# Patient Record
Sex: FEMALE | Race: WHITE | NOT HISPANIC OR LATINO | URBAN - METROPOLITAN AREA
[De-identification: names, ages, dates, MRNs, and addresses within clinical notes are randomized per-mention and may not be internally consistent; named-entity substitution may affect disease eponyms.]

---

## 2018-05-30 ENCOUNTER — EMERGENCY (EMERGENCY)
Facility: HOSPITAL | Age: 43
LOS: 1 days | Discharge: HOME | End: 2018-05-30
Attending: EMERGENCY MEDICINE | Admitting: EMERGENCY MEDICINE
Payer: MEDICAID

## 2018-05-30 VITALS
OXYGEN SATURATION: 97 % | HEART RATE: 70 BPM | RESPIRATION RATE: 16 BRPM | HEIGHT: 66 IN | TEMPERATURE: 98 F | DIASTOLIC BLOOD PRESSURE: 79 MMHG | SYSTOLIC BLOOD PRESSURE: 136 MMHG | WEIGHT: 119.93 LBS

## 2018-05-30 VITALS
RESPIRATION RATE: 16 BRPM | SYSTOLIC BLOOD PRESSURE: 124 MMHG | DIASTOLIC BLOOD PRESSURE: 62 MMHG | OXYGEN SATURATION: 100 % | HEART RATE: 75 BPM | TEMPERATURE: 99 F

## 2018-05-30 LAB
ALBUMIN SERPL ELPH-MCNC: 3.7 G/DL — SIGNIFICANT CHANGE UP (ref 3.3–5)
ALP SERPL-CCNC: 51 U/L — SIGNIFICANT CHANGE UP (ref 30–120)
ALT FLD-CCNC: 22 U/L DA — SIGNIFICANT CHANGE UP (ref 10–60)
ANION GAP SERPL CALC-SCNC: 4 MMOL/L — LOW (ref 5–17)
APPEARANCE UR: CLEAR — SIGNIFICANT CHANGE UP
AST SERPL-CCNC: 22 U/L — SIGNIFICANT CHANGE UP (ref 10–40)
BASOPHILS # BLD AUTO: 0.1 K/UL — SIGNIFICANT CHANGE UP (ref 0–0.2)
BASOPHILS NFR BLD AUTO: 0.8 % — SIGNIFICANT CHANGE UP (ref 0–2)
BILIRUB SERPL-MCNC: 0.3 MG/DL — SIGNIFICANT CHANGE UP (ref 0.2–1.2)
BILIRUB UR-MCNC: NEGATIVE — SIGNIFICANT CHANGE UP
BUN SERPL-MCNC: 22 MG/DL — SIGNIFICANT CHANGE UP (ref 7–23)
CALCIUM SERPL-MCNC: 9 MG/DL — SIGNIFICANT CHANGE UP (ref 8.4–10.5)
CHLORIDE SERPL-SCNC: 101 MMOL/L — SIGNIFICANT CHANGE UP (ref 96–108)
CK MB CFR SERPL CALC: 0.8 NG/ML — SIGNIFICANT CHANGE UP (ref 0–3.6)
CK SERPL-CCNC: 112 U/L — SIGNIFICANT CHANGE UP (ref 26–192)
CO2 SERPL-SCNC: 32 MMOL/L — HIGH (ref 22–31)
COLOR SPEC: YELLOW — SIGNIFICANT CHANGE UP
CREAT SERPL-MCNC: 1.08 MG/DL — SIGNIFICANT CHANGE UP (ref 0.5–1.3)
DIFF PNL FLD: ABNORMAL
EOSINOPHIL # BLD AUTO: 0.1 K/UL — SIGNIFICANT CHANGE UP (ref 0–0.5)
EOSINOPHIL NFR BLD AUTO: 1 % — SIGNIFICANT CHANGE UP (ref 0–6)
GLUCOSE SERPL-MCNC: 108 MG/DL — HIGH (ref 70–99)
GLUCOSE UR QL: NEGATIVE MG/DL — SIGNIFICANT CHANGE UP
HCT VFR BLD CALC: 41.8 % — SIGNIFICANT CHANGE UP (ref 34.5–45)
HGB BLD-MCNC: 13.9 G/DL — SIGNIFICANT CHANGE UP (ref 11.5–15.5)
KETONES UR-MCNC: NEGATIVE — SIGNIFICANT CHANGE UP
LEUKOCYTE ESTERASE UR-ACNC: ABNORMAL
LYMPHOCYTES # BLD AUTO: 1.6 K/UL — SIGNIFICANT CHANGE UP (ref 1–3.3)
LYMPHOCYTES # BLD AUTO: 22.9 % — SIGNIFICANT CHANGE UP (ref 13–44)
MCHC RBC-ENTMCNC: 32.9 PG — SIGNIFICANT CHANGE UP (ref 27–34)
MCHC RBC-ENTMCNC: 33.2 GM/DL — SIGNIFICANT CHANGE UP (ref 32–36)
MCV RBC AUTO: 99.1 FL — SIGNIFICANT CHANGE UP (ref 80–100)
MONOCYTES # BLD AUTO: 0.5 K/UL — SIGNIFICANT CHANGE UP (ref 0–0.9)
MONOCYTES NFR BLD AUTO: 6.5 % — SIGNIFICANT CHANGE UP (ref 2–14)
NEUTROPHILS # BLD AUTO: 4.9 K/UL — SIGNIFICANT CHANGE UP (ref 1.8–7.4)
NEUTROPHILS NFR BLD AUTO: 68.8 % — SIGNIFICANT CHANGE UP (ref 43–77)
NITRITE UR-MCNC: NEGATIVE — SIGNIFICANT CHANGE UP
PH UR: 6 — SIGNIFICANT CHANGE UP (ref 5–8)
PLATELET # BLD AUTO: 222 K/UL — SIGNIFICANT CHANGE UP (ref 150–400)
POTASSIUM SERPL-MCNC: 4.2 MMOL/L — SIGNIFICANT CHANGE UP (ref 3.5–5.3)
POTASSIUM SERPL-SCNC: 4.2 MMOL/L — SIGNIFICANT CHANGE UP (ref 3.5–5.3)
PROT SERPL-MCNC: 7.5 G/DL — SIGNIFICANT CHANGE UP (ref 6–8.3)
PROT UR-MCNC: NEGATIVE MG/DL — SIGNIFICANT CHANGE UP
RBC # BLD: 4.22 M/UL — SIGNIFICANT CHANGE UP (ref 3.8–5.2)
RBC # FLD: 11.2 % — SIGNIFICANT CHANGE UP (ref 10.3–14.5)
SODIUM SERPL-SCNC: 137 MMOL/L — SIGNIFICANT CHANGE UP (ref 135–145)
SP GR SPEC: 1.01 — SIGNIFICANT CHANGE UP (ref 1.01–1.02)
TROPONIN I SERPL-MCNC: 0 NG/ML — LOW (ref 0.02–0.06)
UROBILINOGEN FLD QL: NEGATIVE MG/DL — SIGNIFICANT CHANGE UP
WBC # BLD: 7.1 K/UL — SIGNIFICANT CHANGE UP (ref 3.8–10.5)
WBC # FLD AUTO: 7.1 K/UL — SIGNIFICANT CHANGE UP (ref 3.8–10.5)

## 2018-05-30 PROCEDURE — 99284 EMERGENCY DEPT VISIT MOD MDM: CPT | Mod: 25

## 2018-05-30 PROCEDURE — 70450 CT HEAD/BRAIN W/O DYE: CPT

## 2018-05-30 PROCEDURE — 96374 THER/PROPH/DIAG INJ IV PUSH: CPT

## 2018-05-30 PROCEDURE — 99285 EMERGENCY DEPT VISIT HI MDM: CPT

## 2018-05-30 PROCEDURE — 82550 ASSAY OF CK (CPK): CPT

## 2018-05-30 PROCEDURE — 80053 COMPREHEN METABOLIC PANEL: CPT

## 2018-05-30 PROCEDURE — 93010 ELECTROCARDIOGRAM REPORT: CPT

## 2018-05-30 PROCEDURE — 82553 CREATINE MB FRACTION: CPT

## 2018-05-30 PROCEDURE — 93005 ELECTROCARDIOGRAM TRACING: CPT

## 2018-05-30 PROCEDURE — 81001 URINALYSIS AUTO W/SCOPE: CPT

## 2018-05-30 PROCEDURE — 96361 HYDRATE IV INFUSION ADD-ON: CPT

## 2018-05-30 PROCEDURE — 84484 ASSAY OF TROPONIN QUANT: CPT

## 2018-05-30 PROCEDURE — 70450 CT HEAD/BRAIN W/O DYE: CPT | Mod: 26

## 2018-05-30 PROCEDURE — 85027 COMPLETE CBC AUTOMATED: CPT

## 2018-05-30 RX ORDER — MECLIZINE HCL 12.5 MG
25 TABLET ORAL ONCE
Qty: 0 | Refills: 0 | Status: COMPLETED | OUTPATIENT
Start: 2018-05-30 | End: 2018-05-30

## 2018-05-30 RX ORDER — SODIUM CHLORIDE 9 MG/ML
1000 INJECTION INTRAMUSCULAR; INTRAVENOUS; SUBCUTANEOUS ONCE
Qty: 0 | Refills: 0 | Status: COMPLETED | OUTPATIENT
Start: 2018-05-30 | End: 2018-05-30

## 2018-05-30 RX ORDER — ACETAMINOPHEN 500 MG
650 TABLET ORAL ONCE
Qty: 0 | Refills: 0 | Status: COMPLETED | OUTPATIENT
Start: 2018-05-30 | End: 2018-05-30

## 2018-05-30 RX ORDER — ONDANSETRON 8 MG/1
4 TABLET, FILM COATED ORAL ONCE
Qty: 0 | Refills: 0 | Status: COMPLETED | OUTPATIENT
Start: 2018-05-30 | End: 2018-05-30

## 2018-05-30 RX ADMIN — Medication 25 MILLIGRAM(S): at 16:13

## 2018-05-30 RX ADMIN — ONDANSETRON 4 MILLIGRAM(S): 8 TABLET, FILM COATED ORAL at 15:29

## 2018-05-30 RX ADMIN — SODIUM CHLORIDE 1000 MILLILITER(S): 9 INJECTION INTRAMUSCULAR; INTRAVENOUS; SUBCUTANEOUS at 15:15

## 2018-05-30 RX ADMIN — Medication 650 MILLIGRAM(S): at 16:13

## 2018-05-30 NOTE — ED PROVIDER NOTE - CONDUCTED A DETAILED DISCUSSION WITH PATIENT AND/OR GUARDIAN REGARDING, MDM
radiology results/lab results need for outpatient follow-up/lab results/return to ED if symptoms worsen, persist or questions arise/radiology results

## 2018-05-30 NOTE — ED PROVIDER NOTE - OBJECTIVE STATEMENT
41 y/o F presents with c/o dizziness today. Pt states that she felt slightly dizzy this morning but fine after. States that she was walking with her friend this afternoon when she started feeling dizzy again, sat down and became "unresponsive for ~5-10 mins" as per pt. Pt states that she feels better after pt 43 y/o F presents with c/o dizziness today. Pt states that she felt slightly dizzy this morning but fine after. States that she was walking with her friend this afternoon when she started feeling dizzy again, sat down and became "unresponsive for ~5-10 mins" as per pt. Pt states that she feels better now but still dizzy and nauseous. States that she has been on augmentin since last Thursday for a sinus infection and has been feeling better. C/o mild pressure to forehead. Denies fever, chills, vision changes, vomiting, CP, SOB, numbness, tingling, focal weakness, leg pain/swelling or other symptoms. 43 y/o F presents with c/o dizziness today. Pt states that she felt slightly dizzy this morning but fine after. States that she was walking with her friend this afternoon when she started feeling dizzy again, sat down and became "unresponsive for ~5-10 mins" as per pt. Pt states that she feels better now but still dizzy and nauseous. States that she has been on augmentin since last Thursday for a sinus infection. C/o mild pressure to forehead. Denies fever, chills, vision changes, vomiting, CP, SOB, numbness, tingling, focal weakness, leg pain/swelling or other symptoms.

## 2018-05-30 NOTE — ED PROVIDER NOTE - PROGRESS NOTE DETAILS
Pt examined by ED attending, Dr. Lombardi who agreed with disposition and plan. Attending Contribution to Care:  42 y.o F c/o dizziness and possibly having passed out today. Pt has been on augmentin for 7 days for sinus infection, c/o pressure in sinuses. denies fever, headache, visual disturbance. PE unrevealing, neuro intact. Plan- CT head, labs, EKG. If negative refer to ENT for evaluation of sinusitis. Reevaluated patient at bedside.  Patient feeling much improved.  Discussed the results of all diagnostic testing in ED and copies of all reports given.   An opportunity to ask questions was given.  Discussed the importance of prompt, close medical follow-up.  Patient will return with any changes, concerns or persistent / worsening symptoms.  Understanding of all instructions verbalized.

## 2018-05-30 NOTE — ED PROVIDER NOTE - MEDICAL DECISION MAKING DETAILS
43 y/o F with dizziness and syncope today; +nausea and pressure to forehead with dizziness now; VSS, no neuro deficits; will get ct head, ekg, labs, ivf, zofran, re-assess

## 2019-01-11 ENCOUNTER — EMERGENCY (EMERGENCY)
Facility: HOSPITAL | Age: 44
LOS: 1 days | End: 2019-01-11
Attending: EMERGENCY MEDICINE | Admitting: EMERGENCY MEDICINE
Payer: MEDICAID

## 2019-01-11 VITALS
SYSTOLIC BLOOD PRESSURE: 109 MMHG | OXYGEN SATURATION: 96 % | DIASTOLIC BLOOD PRESSURE: 57 MMHG | RESPIRATION RATE: 14 BRPM | HEART RATE: 72 BPM | TEMPERATURE: 99 F

## 2019-01-11 VITALS
OXYGEN SATURATION: 97 % | HEIGHT: 66 IN | DIASTOLIC BLOOD PRESSURE: 81 MMHG | TEMPERATURE: 98 F | WEIGHT: 125 LBS | SYSTOLIC BLOOD PRESSURE: 127 MMHG | HEART RATE: 84 BPM | RESPIRATION RATE: 16 BRPM

## 2019-01-11 LAB
ALBUMIN SERPL ELPH-MCNC: 3.6 G/DL — SIGNIFICANT CHANGE UP (ref 3.3–5)
ALP SERPL-CCNC: 45 U/L — SIGNIFICANT CHANGE UP (ref 30–120)
ALT FLD-CCNC: 80 U/L DA — HIGH (ref 10–60)
ANION GAP SERPL CALC-SCNC: 5 MMOL/L — SIGNIFICANT CHANGE UP (ref 5–17)
APPEARANCE UR: CLEAR — SIGNIFICANT CHANGE UP
AST SERPL-CCNC: 237 U/L — HIGH (ref 10–40)
BACTERIA # UR AUTO: ABNORMAL
BASOPHILS # BLD AUTO: 0.03 K/UL — SIGNIFICANT CHANGE UP (ref 0–0.2)
BASOPHILS NFR BLD AUTO: 0.5 % — SIGNIFICANT CHANGE UP (ref 0–2)
BILIRUB SERPL-MCNC: 0.5 MG/DL — SIGNIFICANT CHANGE UP (ref 0.2–1.2)
BILIRUB UR-MCNC: NEGATIVE — SIGNIFICANT CHANGE UP
BUN SERPL-MCNC: 13 MG/DL — SIGNIFICANT CHANGE UP (ref 7–23)
CALCIUM SERPL-MCNC: 8.8 MG/DL — SIGNIFICANT CHANGE UP (ref 8.4–10.5)
CHLORIDE SERPL-SCNC: 102 MMOL/L — SIGNIFICANT CHANGE UP (ref 96–108)
CO2 SERPL-SCNC: 30 MMOL/L — SIGNIFICANT CHANGE UP (ref 22–31)
COLOR SPEC: YELLOW — SIGNIFICANT CHANGE UP
CREAT SERPL-MCNC: 0.92 MG/DL — SIGNIFICANT CHANGE UP (ref 0.5–1.3)
DIFF PNL FLD: ABNORMAL
EOSINOPHIL # BLD AUTO: 0.3 K/UL — SIGNIFICANT CHANGE UP (ref 0–0.5)
EOSINOPHIL NFR BLD AUTO: 5 % — SIGNIFICANT CHANGE UP (ref 0–6)
EPI CELLS # UR: SIGNIFICANT CHANGE UP
GLUCOSE SERPL-MCNC: 91 MG/DL — SIGNIFICANT CHANGE UP (ref 70–99)
GLUCOSE UR QL: NEGATIVE MG/DL — SIGNIFICANT CHANGE UP
HCG UR QL: NEGATIVE — SIGNIFICANT CHANGE UP
HCT VFR BLD CALC: 40.2 % — SIGNIFICANT CHANGE UP (ref 34.5–45)
HGB BLD-MCNC: 13.5 G/DL — SIGNIFICANT CHANGE UP (ref 11.5–15.5)
IMM GRANULOCYTES NFR BLD AUTO: 0.3 % — SIGNIFICANT CHANGE UP (ref 0–1.5)
KETONES UR-MCNC: NEGATIVE — SIGNIFICANT CHANGE UP
LEUKOCYTE ESTERASE UR-ACNC: ABNORMAL
LYMPHOCYTES # BLD AUTO: 1.45 K/UL — SIGNIFICANT CHANGE UP (ref 1–3.3)
LYMPHOCYTES # BLD AUTO: 24.1 % — SIGNIFICANT CHANGE UP (ref 13–44)
MAGNESIUM SERPL-MCNC: 1.8 MG/DL — SIGNIFICANT CHANGE UP (ref 1.6–2.6)
MCHC RBC-ENTMCNC: 32.7 PG — SIGNIFICANT CHANGE UP (ref 27–34)
MCHC RBC-ENTMCNC: 33.6 GM/DL — SIGNIFICANT CHANGE UP (ref 32–36)
MCV RBC AUTO: 97.3 FL — SIGNIFICANT CHANGE UP (ref 80–100)
MONOCYTES # BLD AUTO: 0.45 K/UL — SIGNIFICANT CHANGE UP (ref 0–0.9)
MONOCYTES NFR BLD AUTO: 7.5 % — SIGNIFICANT CHANGE UP (ref 2–14)
NEUTROPHILS # BLD AUTO: 3.76 K/UL — SIGNIFICANT CHANGE UP (ref 1.8–7.4)
NEUTROPHILS NFR BLD AUTO: 62.6 % — SIGNIFICANT CHANGE UP (ref 43–77)
NITRITE UR-MCNC: NEGATIVE — SIGNIFICANT CHANGE UP
NRBC # BLD: 0 /100 WBCS — SIGNIFICANT CHANGE UP (ref 0–0)
PH UR: 5 — SIGNIFICANT CHANGE UP (ref 5–8)
PHOSPHATE SERPL-MCNC: 2.7 MG/DL — SIGNIFICANT CHANGE UP (ref 2.5–4.5)
PLATELET # BLD AUTO: 192 K/UL — SIGNIFICANT CHANGE UP (ref 150–400)
POTASSIUM SERPL-MCNC: 4 MMOL/L — SIGNIFICANT CHANGE UP (ref 3.5–5.3)
POTASSIUM SERPL-SCNC: 4 MMOL/L — SIGNIFICANT CHANGE UP (ref 3.5–5.3)
PROT SERPL-MCNC: 6.5 G/DL — SIGNIFICANT CHANGE UP (ref 6–8.3)
PROT UR-MCNC: 30 MG/DL
RBC # BLD: 4.13 M/UL — SIGNIFICANT CHANGE UP (ref 3.8–5.2)
RBC # FLD: 11.9 % — SIGNIFICANT CHANGE UP (ref 10.3–14.5)
RBC CASTS # UR COMP ASSIST: SIGNIFICANT CHANGE UP /HPF (ref 0–4)
SODIUM SERPL-SCNC: 137 MMOL/L — SIGNIFICANT CHANGE UP (ref 135–145)
SP GR SPEC: 1.02 — SIGNIFICANT CHANGE UP (ref 1.01–1.02)
UROBILINOGEN FLD QL: NEGATIVE MG/DL — SIGNIFICANT CHANGE UP
WBC # BLD: 6.01 K/UL — SIGNIFICANT CHANGE UP (ref 3.8–10.5)
WBC # FLD AUTO: 6.01 K/UL — SIGNIFICANT CHANGE UP (ref 3.8–10.5)
WBC UR QL: SIGNIFICANT CHANGE UP

## 2019-01-11 PROCEDURE — 70450 CT HEAD/BRAIN W/O DYE: CPT

## 2019-01-11 PROCEDURE — 99284 EMERGENCY DEPT VISIT MOD MDM: CPT | Mod: 25

## 2019-01-11 PROCEDURE — 81025 URINE PREGNANCY TEST: CPT

## 2019-01-11 PROCEDURE — 83735 ASSAY OF MAGNESIUM: CPT

## 2019-01-11 PROCEDURE — 99285 EMERGENCY DEPT VISIT HI MDM: CPT

## 2019-01-11 PROCEDURE — 36415 COLL VENOUS BLD VENIPUNCTURE: CPT

## 2019-01-11 PROCEDURE — 85027 COMPLETE CBC AUTOMATED: CPT

## 2019-01-11 PROCEDURE — 80053 COMPREHEN METABOLIC PANEL: CPT

## 2019-01-11 PROCEDURE — 84100 ASSAY OF PHOSPHORUS: CPT

## 2019-01-11 PROCEDURE — 70450 CT HEAD/BRAIN W/O DYE: CPT | Mod: 26

## 2019-01-11 PROCEDURE — 81001 URINALYSIS AUTO W/SCOPE: CPT

## 2019-01-11 PROCEDURE — 82962 GLUCOSE BLOOD TEST: CPT

## 2019-01-11 RX ORDER — LEVETIRACETAM 250 MG/1
1000 TABLET, FILM COATED ORAL ONCE
Qty: 0 | Refills: 0 | Status: COMPLETED | OUTPATIENT
Start: 2019-01-11 | End: 2019-01-11

## 2019-01-11 RX ORDER — ACETAMINOPHEN 500 MG
650 TABLET ORAL ONCE
Qty: 0 | Refills: 0 | Status: COMPLETED | OUTPATIENT
Start: 2019-01-11 | End: 2019-01-11

## 2019-01-11 RX ORDER — LEVETIRACETAM 250 MG/1
1 TABLET, FILM COATED ORAL
Qty: 60 | Refills: 6 | OUTPATIENT
Start: 2019-01-11 | End: 2019-08-08

## 2019-01-11 RX ADMIN — LEVETIRACETAM 1000 MILLIGRAM(S): 250 TABLET, FILM COATED ORAL at 09:58

## 2019-01-11 RX ADMIN — Medication 650 MILLIGRAM(S): at 10:06

## 2019-01-11 RX ADMIN — Medication 650 MILLIGRAM(S): at 09:23

## 2019-01-11 NOTE — ED PROVIDER NOTE - NSFOLLOWUPCLINICS_GEN_ALL_ED_FT
[facility]  Neurology  [address] [place] [address] - [address] [place] [address]  Phone: [phone]  Fax:   Follow Up Time: 4-6 Days

## 2019-01-11 NOTE — ED PROVIDER NOTE - NSFOLLOWUPINSTRUCTIONS_ED_ALL_ED_FT
1. Follow up Neurologist for new onset seizure for further workup.  2. Keppra 500mg twice a day.  Avoid alcohol.  Compliance is key with this medication.  3. No driving, swimming, ladders until cleared by Neurologist.  4. Tylenol and Motrin for pain/headache.  5. Return for any concerns.  6. Follow up PCP for routine follow up and copy of labs and ct to your PCP.

## 2019-01-11 NOTE — ED PROVIDER NOTE - MEDICAL DECISION MAKING DETAILS
Dr. Shea Note: new onset seizure, consider possible pathological causes given age, given 2nd possible episode, would consider starting treatment and refer to neuro

## 2019-01-11 NOTE — ED ADULT NURSE NOTE - NSIMPLEMENTINTERV_GEN_ALL_ED
Implemented All Universal Safety Interventions:  [place] to call system. Call bell, personal items and telephone within reach. Instruct patient to call for assistance. Room bathroom lighting operational. Non-slip footwear when patient is off stretcher. Physically safe environment: no spills, clutter or unnecessary equipment. Stretcher in lowest position, wheels locked, appropriate side rails in place.

## 2019-01-11 NOTE — ED ADULT NURSE NOTE - OBJECTIVE STATEMENT
Patient brought to ER via ambulance from home where patient had a seizure in bed witnessed by .   states "she became stiff, eyes rolled back, foaming at mouth lasting for over a minute."  On arrival to ER patient awake, alert to name, does not know day of week.  No incontinence noted.  IV med lock #20 left AC by EMS.  Accucheck in ER 90.   at bedside.  CM:NSR HR 70's.

## 2019-01-11 NOTE — ED PROVIDER NOTE - OBJECTIVE STATEMENT
Dr. Shea Note: 43F presents via EMS with  at bedside who witnessed patient having a seizure upon awakening in bed, lasted <5min, had post-ictal agitation for several minutes, then confusion, and now back to baseline.  No fever, vomiting, cp, sob.  +slight post-ictal headache.  No preceding symptoms (pt asleep).  Had an episode May 2018 (see ED chart for details).

## 2019-01-11 NOTE — ED PROVIDER NOTE - PROGRESS NOTE DETAILS
Dr. Shea Note: pt stable, spoke to Dr. Melchor who agrees with starting keppra and f/u outpt, pt and family educated about seizure, no driving, no ladders, pools, avoid alcohol, medication complaince, side effects of keppra, stable for dc home, f/u neuro for MRI and EEG.  Also educated about LFTs and f/u PCP.

## 2019-01-12 PROBLEM — R56.9 UNSPECIFIED CONVULSIONS: Chronic | Status: INACTIVE | Noted: 2019-01-11 | Resolved: 2019-01-11

## 2019-01-23 PROBLEM — I82.90 ACUTE EMBOLISM AND THROMBOSIS OF UNSPECIFIED VEIN: Chronic | Status: ACTIVE | Noted: 2019-01-11

## 2019-02-28 ENCOUNTER — APPOINTMENT (OUTPATIENT)
Dept: NEUROLOGY | Facility: CLINIC | Age: 44
End: 2019-02-28
Payer: MEDICAID

## 2019-02-28 VITALS
DIASTOLIC BLOOD PRESSURE: 71 MMHG | HEART RATE: 58 BPM | HEIGHT: 66.5 IN | SYSTOLIC BLOOD PRESSURE: 115 MMHG | BODY MASS INDEX: 19.06 KG/M2 | WEIGHT: 120 LBS

## 2019-02-28 PROCEDURE — 99204 OFFICE O/P NEW MOD 45 MIN: CPT

## 2019-02-28 NOTE — HISTORY OF PRESENT ILLNESS
[FreeTextEntry1] : cc- seizures\par \par HpI- 42 y/o Rh woman was well until 5/30/18 when she had a sudden feeling as if she would pass out.  Had a sinus infection on Amoxacillin.  Was walking with friend and felt as if she would pass out. Sat on the ground and at first aware her friend was calling her but then could not focus and was unaware but laughing for about 2-3 minutes. She remained in a seated posture.and became aware but still giggling when  arrived.   brought her to [facility]- ct c/w sinusitis and labs unremarkable. \par \par Saw PMD who ordered a three day heart monitor that was normal. Was well until Sept 5th 2018 awoke with left eye blurred vision and a diagonal line of vision through her left eye vision. Saw ophthalmologist who found central retinal vein occlusion.  Had a hematological w/u that showed a positive anticardiolipin Ab (IgM 17) and anti-glycoprotein Ab (20).\par \par Take off OCPs and placed on baby ASA. Left eye vision was 20/50-2.\par Vision had gradually improved and ASA tapered off.  Titers improved.\par \par On Jan 11th at 630 AM,  witnessed a 2 mins of generalized seizure in bed. Had postictal myoclonus and post ictal state for 30-45 mins.  Had interrupted sleep but not that unusual.  EMS arrived in about ten minutes.  She was confused and then combative.  No TB or incontinence. Became oriented in the ambulance.  At [place] [facility], CT Head negative.  Started on Levetiracetam 500 bid.\par Saw Dr. Mari who doubled it but she became irritable and tired.  Dose was reduced to 500-1000.  No longer has any side effects. \par \par Had routine and a home VEEG. VEEG showed rare generalized spike and wave with bifrontal  predominance.  MRI brain, TCDs and carotids were normal.\par \par \par

## 2019-02-28 NOTE — PHYSICAL EXAM
[FreeTextEntry1] : alert and oriented x3\par attn conc lang nl\par STM 2/3 at 5 mins\par CN intact in detail\par DTRs 3 and symmetric\par motor- 5/5\par sens- intact in detail\par CSG wnl

## 2019-02-28 NOTE — ASSESSMENT
[FreeTextEntry1] : A/P- 44 y/o RH woman well without epilepsy risk factors until May 2018 when she suffered an unusual episode of lightheadedness followed by loss of awareness with inappropriate laughter.  In September had a left central retinal vein occlusion and subsequent diagnosis of an antiphospholipid antibody syndrome for which she was taken off OCPs and placed on ASA. After doing well for a few months she had a witnessed GTC in sleep. MRI unremarkable but EEG with ?generalized bifrontally predominant spike and wave discharges in sleep. \par \par It is difficult to connect the hematological abnormality to the new onset seizures with a normal MRI and cerebro-vascular work up. It is also unclear whether the initial event in May was a seizure or not. 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 She has not had further seizures on Keppra and is tolerating the drug, except for only mild patricai-menstrual irritability, although she would like to take less medication if possible.\par - continue Levetiracetam 500-1000 for now\par - I will review the EEG tracing if possible as well as the MRI images when sent to me\par - we will consider a reduction in levetiracetam dosage in 4-6 weeks if no episodes followed by a 48 hr ambulatory EEG\par - she will continue to take a baby ASA\par - I have instructed her about [place] state driving laws and not driving for a minimum of 6 mos\par - rtc 4-6 weeks\par

## 2019-03-01 ENCOUNTER — RECORD ABSTRACTING (OUTPATIENT)
Age: 44
End: 2019-03-01

## 2019-03-01 DIAGNOSIS — Z83.438 FAMILY HISTORY OF OTHER DISORDER OF LIPOPROTEIN METABOLISM AND OTHER LIPIDEMIA: ICD-10-CM

## 2019-03-01 DIAGNOSIS — M51.26 OTHER INTERVERTEBRAL DISC DISPLACEMENT, LUMBAR REGION: ICD-10-CM

## 2019-03-01 DIAGNOSIS — M51.36 OTHER INTERVERTEBRAL DISC DISPLACEMENT, LUMBAR REGION: ICD-10-CM

## 2019-04-02 ENCOUNTER — APPOINTMENT (OUTPATIENT)
Dept: NEUROLOGY | Facility: HOSPITAL | Age: 44
End: 2019-04-02

## 2019-04-03 ENCOUNTER — APPOINTMENT (OUTPATIENT)
Dept: NEUROLOGY | Facility: CLINIC | Age: 44
End: 2019-04-03
Payer: MEDICAID

## 2019-04-03 VITALS
HEART RATE: 64 BPM | HEIGHT: 66.5 IN | BODY MASS INDEX: 19.22 KG/M2 | DIASTOLIC BLOOD PRESSURE: 74 MMHG | WEIGHT: 121 LBS | SYSTOLIC BLOOD PRESSURE: 126 MMHG

## 2019-04-03 PROCEDURE — 99214 OFFICE O/P EST MOD 30 MIN: CPT

## 2019-04-03 RX ORDER — AZELASTINE HYDROCHLORIDE AND FLUTICASONE PROPIONATE 137; 50 UG/1; UG/1
137-50 SPRAY, METERED NASAL
Refills: 0 | Status: DISCONTINUED | COMMUNITY
End: 2019-04-03

## 2019-04-03 RX ORDER — MELOXICAM 7.5 MG/1
7.5 TABLET ORAL DAILY
Refills: 0 | Status: DISCONTINUED | COMMUNITY
End: 2019-04-03

## 2019-04-03 NOTE — PHYSICAL EXAM
[FreeTextEntry1] : Neuro\par alert and oriented x3\par attn conc lang nl \par Cn intact in detail\par motor 5/5\par sensory intact in detail\par CSG - wnl

## 2019-04-03 NOTE — ASSESSMENT
[FreeTextEntry1] : \par A/P- 42 y/o RH woman well without epilepsy risk factors until May 2018 when she suffered an unusual episode of lightheadedness followed by loss of awareness with inappropriate laughter. In September had a left central retinal vein occlusion and subsequent diagnosis of an antiphospholipid antibody syndrome for which she was taken off OCPs and placed on ASA. After doing well for a few months she had a witnessed GTC in sleep. MRI unremarkable but EEG with ?generalized bifrontally predominant spike and wave discharges in sleep. \par \par It is difficult to connect the hematological abnormality to the new onset seizures with a normal MRI and cerebro-vascular work up. It is also unclear whether the initial event in May was a seizure or not. 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 She has not had further seizures on Keppra and is tolerating the drug, except for only mild patricia-menstrual irritability, although she would like to take less medication if possible.\par - reduce Keppra to 500-750 x1 mos, then two weeks later 500 bid and 48hr amb EEG.\par RTC 3-4 mos after amb EEG\par - also c/o sleep difficulty and disordered breathing\par - RTC 3-4m\par

## 2019-04-03 NOTE — HISTORY OF PRESENT ILLNESS
[FreeTextEntry1] : cc- seizures\par \par HPI- Since last visit no auras, seizures, syncope or focal sxs.\par Only c/o is new joint aches ?when Keppra started.\par Sleep has changed, ?breathing different.\par \par alprazolam 0.25 (never uses)\par Levetiracetam  500-1000\par L- methylfolate\par baby ASA

## 2019-04-08 ENCOUNTER — APPOINTMENT (OUTPATIENT)
Dept: RHEUMATOLOGY | Facility: CLINIC | Age: 44
End: 2019-04-08

## 2019-05-17 ENCOUNTER — APPOINTMENT (OUTPATIENT)
Dept: NEUROLOGY | Facility: CLINIC | Age: 44
End: 2019-05-17

## 2019-07-17 ENCOUNTER — OTHER (OUTPATIENT)
Age: 44
End: 2019-07-17

## 2019-07-29 ENCOUNTER — APPOINTMENT (OUTPATIENT)
Dept: NEUROLOGY | Facility: CLINIC | Age: 44
End: 2019-07-29
Payer: MEDICAID

## 2019-07-29 PROCEDURE — 95806 SLEEP STUDY UNATT&RESP EFFT: CPT

## 2019-07-29 PROCEDURE — 95819 EEG AWAKE AND ASLEEP: CPT

## 2019-07-30 ENCOUNTER — OTHER (OUTPATIENT)
Age: 44
End: 2019-07-30

## 2019-07-30 PROCEDURE — 95953: CPT

## 2019-07-31 PROCEDURE — 95953: CPT

## 2019-08-05 ENCOUNTER — OTHER (OUTPATIENT)
Age: 44
End: 2019-08-05

## 2019-11-29 ENCOUNTER — MOBILE ON CALL (OUTPATIENT)
Age: 44
End: 2019-11-29

## 2019-12-04 ENCOUNTER — APPOINTMENT (OUTPATIENT)
Dept: NEUROLOGY | Facility: CLINIC | Age: 44
End: 2019-12-04
Payer: MEDICAID

## 2019-12-04 VITALS
HEART RATE: 62 BPM | BODY MASS INDEX: 19.22 KG/M2 | SYSTOLIC BLOOD PRESSURE: 112 MMHG | WEIGHT: 121 LBS | HEIGHT: 66.5 IN | DIASTOLIC BLOOD PRESSURE: 73 MMHG

## 2019-12-04 PROCEDURE — 99214 OFFICE O/P EST MOD 30 MIN: CPT

## 2019-12-06 NOTE — ASSESSMENT
[FreeTextEntry1] : 42 y/o RH woman well without epilepsy risk factors until May 2018 when she suffered an unusual episode of lightheadedness followed by loss of awareness with inappropriate laughter. In September had a left central retinal vein occlusion and subsequent diagnosis of an antiphospholipid antibody syndrome for which she was taken off OCPs and placed on ASA. After doing well for a few months she had a witnessed GTC in sleep. MRI unremarkable but EEG with ?generalized bifrontally predominant spike and wave discharges in sleep. \par Tolerating keppra, amb EEG negative, no PEPE on sleep study,\par -cont Keppra 500 bid\par - RTC 3-4m\par

## 2019-12-06 NOTE — PHYSICAL EXAM
[FreeTextEntry1] : Neuro\par alert and oriented x3\par attn conc lang nl \par Cn intact in detail\par motor 5/5\par sensory intact in detail\par CSG - wnl. \par

## 2020-01-09 ENCOUNTER — APPOINTMENT (OUTPATIENT)
Dept: INTERNAL MEDICINE | Facility: CLINIC | Age: 45
End: 2020-01-09
Payer: MEDICAID

## 2020-01-09 VITALS
BODY MASS INDEX: 19.86 KG/M2 | RESPIRATION RATE: 16 BRPM | DIASTOLIC BLOOD PRESSURE: 68 MMHG | SYSTOLIC BLOOD PRESSURE: 116 MMHG | WEIGHT: 125.06 LBS | HEIGHT: 66.5 IN | TEMPERATURE: 98.2 F | OXYGEN SATURATION: 99 % | HEART RATE: 60 BPM

## 2020-01-09 DIAGNOSIS — Z87.891 PERSONAL HISTORY OF NICOTINE DEPENDENCE: ICD-10-CM

## 2020-01-09 PROCEDURE — 99214 OFFICE O/P EST MOD 30 MIN: CPT | Mod: 25

## 2020-01-09 PROCEDURE — 36415 COLL VENOUS BLD VENIPUNCTURE: CPT

## 2020-01-09 PROCEDURE — G0444 DEPRESSION SCREEN ANNUAL: CPT | Mod: 59

## 2020-01-09 NOTE — HEALTH RISK ASSESSMENT
[2] : 2) Feeling down, depressed, or hopeless for more than half of the days (2) [1 or 2 (0 pts)] : 1 or 2 (0 points) [2 - 3 times a week (3 pts)] : 2 - 3  times a week (3 points) [Never (0 pts)] : Never (0 points) [] : No [Audit-CScore] : 3 [CDW3Qojdx] : 10 [ALI3Ibveq] : 4 [PapSmearDate] : 02/19 [Patient reported PAP Smear was normal] : Patient reported PAP Smear was normal [PapSmearComments] : NILM

## 2020-01-09 NOTE — PAST MEDICAL HISTORY
[Menstruating] : menstruating [Total Preg ___] : G[unfilled] [Definite ___ (Date)] : the last menstrual period was [unfilled] [Live Births ___] : P[unfilled]  [Full Term ___] : Full Term: [unfilled] [Living ___] : Living: [unfilled]

## 2020-01-09 NOTE — PHYSICAL EXAM
[No Acute Distress] : no acute distress [Well Nourished] : well nourished [Well-Appearing] : well-appearing [Well Developed] : well developed [PERRL] : pupils equal round and reactive to light [Normal Sclera/Conjunctiva] : normal sclera/conjunctiva [Normal Outer Ear/Nose] : the outer ears and nose were normal in appearance [EOMI] : extraocular movements intact [Normal Oropharynx] : the oropharynx was normal [No JVD] : no jugular venous distention [No Lymphadenopathy] : no lymphadenopathy [Supple] : supple [Thyroid Normal, No Nodules] : the thyroid was normal and there were no nodules present [No Respiratory Distress] : no respiratory distress  [Clear to Auscultation] : lungs were clear to auscultation bilaterally [No Accessory Muscle Use] : no accessory muscle use [Normal Rate] : normal rate  [Regular Rhythm] : with a regular rhythm [Normal S1, S2] : normal S1 and S2 [No Murmur] : no murmur heard [No Carotid Bruits] : no carotid bruits [No Abdominal Bruit] : a ~M bruit was not heard ~T in the abdomen [No Varicosities] : no varicosities [No Edema] : there was no peripheral edema [Pedal Pulses Present] : the pedal pulses are present [No Palpable Aorta] : no palpable aorta [No Extremity Clubbing/Cyanosis] : no extremity clubbing/cyanosis [Non-distended] : non-distended [Non Tender] : non-tender [Soft] : abdomen soft [No HSM] : no HSM [No Masses] : no abdominal mass palpated [Normal Posterior Cervical Nodes] : no posterior cervical lymphadenopathy [Normal Bowel Sounds] : normal bowel sounds [Normal Anterior Cervical Nodes] : no anterior cervical lymphadenopathy [No Spinal Tenderness] : no spinal tenderness [No CVA Tenderness] : no CVA  tenderness [No Joint Swelling] : no joint swelling [Grossly Normal Strength/Tone] : grossly normal strength/tone [No Rash] : no rash [Coordination Grossly Intact] : coordination grossly intact [No Focal Deficits] : no focal deficits [Normal Affect] : the affect was normal [Normal Gait] : normal gait [Deep Tendon Reflexes (DTR)] : deep tendon reflexes were 2+ and symmetric [Normal Insight/Judgement] : insight and judgment were intact

## 2020-01-09 NOTE — HISTORY OF PRESENT ILLNESS
[FreeTextEntry1] : Check up and PPD check  [de-identified] : Ms. BOYCE is a 44 year  female, who present to the office for work physical form to be filled out.  \par States she feels well.  Denies any change in her medical condition since last visit.  Denies having fever, chills or night sweats.  \par Denies having any recent seizures (Only had one seizure).  \par States vision is better

## 2020-01-09 NOTE — ASSESSMENT
[FreeTextEntry1] : A 44 year old female who present to the office for a work physical form to be filled out and evaluation of TB

## 2020-01-09 NOTE — PLAN
[FreeTextEntry1] : Neuro - seizure disorder - Continue current medication.  Advised to follow up with neuro.  \par \par Optho - Retinal eye occlusion -  Follow up with opthalmology.  \par \par Psych - Anxiety depression - reviewed depression screening with the pt.  Counseling given to the pt.  Consider psychotherapy.\par \par IMMUNIZATION Record REVIEWED TB gold test done today \par see work physical form \par \par refused flu shot \par \par Addendum reviewed pap smear see scan

## 2020-01-14 LAB
M TB IFN-G BLD-IMP: NEGATIVE
QUANTIFERON TB PLUS MITOGEN MINUS NIL: 8.68 IU/ML
QUANTIFERON TB PLUS NIL: 0.24 IU/ML
QUANTIFERON TB PLUS TB1 MINUS NIL: -0.09 IU/ML
QUANTIFERON TB PLUS TB2 MINUS NIL: -0.07 IU/ML

## 2020-02-18 ENCOUNTER — RX RENEWAL (OUTPATIENT)
Age: 45
End: 2020-02-18

## 2020-03-10 ENCOUNTER — RX RENEWAL (OUTPATIENT)
Age: 45
End: 2020-03-10

## 2020-04-02 ENCOUNTER — RX RENEWAL (OUTPATIENT)
Age: 45
End: 2020-04-02

## 2020-05-05 ENCOUNTER — RX CHANGE (OUTPATIENT)
Age: 45
End: 2020-05-05

## 2020-05-05 RX ORDER — LEVETIRACETAM 500 MG/1
500 TABLET, FILM COATED ORAL
Qty: 60 | Refills: 5 | Status: DISCONTINUED | COMMUNITY
Start: 2020-02-18 | End: 2020-05-05

## 2020-05-08 ENCOUNTER — RX CHANGE (OUTPATIENT)
Age: 45
End: 2020-05-08

## 2020-05-08 RX ORDER — LEVETIRACETAM 500 MG/1
500 TABLET, FILM COATED ORAL
Qty: 180 | Refills: 2 | Status: DISCONTINUED | COMMUNITY
Start: 2020-05-05 | End: 2020-05-08

## 2020-05-18 ENCOUNTER — TRANSCRIPTION ENCOUNTER (OUTPATIENT)
Age: 45
End: 2020-05-18

## 2020-11-02 ENCOUNTER — RX CHANGE (OUTPATIENT)
Age: 45
End: 2020-11-02

## 2020-11-04 ENCOUNTER — TRANSCRIPTION ENCOUNTER (OUTPATIENT)
Age: 45
End: 2020-11-04

## 2020-11-27 ENCOUNTER — RX CHANGE (OUTPATIENT)
Age: 45
End: 2020-11-27

## 2020-12-01 ENCOUNTER — APPOINTMENT (OUTPATIENT)
Dept: NEUROLOGY | Facility: CLINIC | Age: 45
End: 2020-12-01
Payer: MEDICAID

## 2020-12-01 VITALS
SYSTOLIC BLOOD PRESSURE: 138 MMHG | HEIGHT: 66.5 IN | WEIGHT: 123 LBS | DIASTOLIC BLOOD PRESSURE: 77 MMHG | HEART RATE: 66 BPM | BODY MASS INDEX: 19.53 KG/M2

## 2020-12-01 VITALS — TEMPERATURE: 97.6 F

## 2020-12-01 PROCEDURE — 99214 OFFICE O/P EST MOD 30 MIN: CPT

## 2020-12-01 PROCEDURE — 99072 ADDL SUPL MATRL&STAF TM PHE: CPT

## 2020-12-01 NOTE — ASSESSMENT
[FreeTextEntry1] : A/p-\par 1. Possible partial seizure in past as well as a witnessed GTC in sleep. All occurred in context of a dx of anti-phospholipid syndrome and a CRV occlusion. Now stable on baby ASA. \par No seizures since Lev introduced and Nl 48 hr EEG. \par  - continue Lev 500 bid\par \par 2. C/o intermittent tinnitus- and episodes of vertigo with mildly positive Ancona-hallpike with left ear down\par refer to Dr. Novoa\par - MRI brain with attn to IACs\par - RTC 6m. \par \par

## 2020-12-10 ENCOUNTER — OUTPATIENT (OUTPATIENT)
Dept: OUTPATIENT SERVICES | Facility: HOSPITAL | Age: 45
LOS: 1 days | End: 2020-12-10
Payer: MEDICAID

## 2020-12-10 ENCOUNTER — APPOINTMENT (OUTPATIENT)
Dept: MRI IMAGING | Facility: CLINIC | Age: 45
End: 2020-12-10
Payer: MEDICAID

## 2020-12-10 DIAGNOSIS — G40.109 LOCALIZATION-RELATED (FOCAL) (PARTIAL) SYMPTOMATIC EPILEPSY AND EPILEPTIC SYNDROMES WITH SIMPLE PARTIAL SEIZURES, NOT INTRACTABLE, WITHOUT STATUS EPILEPTICUS: ICD-10-CM

## 2020-12-10 PROCEDURE — A9585: CPT

## 2020-12-10 PROCEDURE — 70553 MRI BRAIN STEM W/O & W/DYE: CPT | Mod: 26

## 2020-12-10 PROCEDURE — 70553 MRI BRAIN STEM W/O & W/DYE: CPT

## 2020-12-23 ENCOUNTER — TRANSCRIPTION ENCOUNTER (OUTPATIENT)
Age: 45
End: 2020-12-23

## 2020-12-30 ENCOUNTER — RX CHANGE (OUTPATIENT)
Age: 45
End: 2020-12-30

## 2021-01-07 ENCOUNTER — APPOINTMENT (OUTPATIENT)
Dept: INTERNAL MEDICINE | Facility: CLINIC | Age: 46
End: 2021-01-07

## 2021-01-12 ENCOUNTER — RESULT CHARGE (OUTPATIENT)
Age: 46
End: 2021-01-12

## 2021-01-13 ENCOUNTER — NON-APPOINTMENT (OUTPATIENT)
Age: 46
End: 2021-01-13

## 2021-01-13 ENCOUNTER — LABORATORY RESULT (OUTPATIENT)
Age: 46
End: 2021-01-13

## 2021-01-13 ENCOUNTER — APPOINTMENT (OUTPATIENT)
Dept: INTERNAL MEDICINE | Facility: CLINIC | Age: 46
End: 2021-01-13
Payer: MEDICAID

## 2021-01-13 VITALS
HEIGHT: 66 IN | DIASTOLIC BLOOD PRESSURE: 68 MMHG | TEMPERATURE: 97.2 F | BODY MASS INDEX: 19.77 KG/M2 | WEIGHT: 123 LBS | OXYGEN SATURATION: 99 % | HEART RATE: 93 BPM | SYSTOLIC BLOOD PRESSURE: 120 MMHG | RESPIRATION RATE: 16 BRPM

## 2021-01-13 DIAGNOSIS — Z23 ENCOUNTER FOR IMMUNIZATION: ICD-10-CM

## 2021-01-13 DIAGNOSIS — Z13.220 ENCOUNTER FOR SCREENING FOR LIPOID DISORDERS: ICD-10-CM

## 2021-01-13 DIAGNOSIS — H93.19 TINNITUS, UNSPECIFIED EAR: ICD-10-CM

## 2021-01-13 DIAGNOSIS — L67.9 HAIR COLOR AND HAIR SHAFT ABNORMALITY, UNSPECIFIED: ICD-10-CM

## 2021-01-13 DIAGNOSIS — M54.9 DORSALGIA, UNSPECIFIED: ICD-10-CM

## 2021-01-13 PROCEDURE — 93000 ELECTROCARDIOGRAM COMPLETE: CPT

## 2021-01-13 PROCEDURE — 99072 ADDL SUPL MATRL&STAF TM PHE: CPT

## 2021-01-13 PROCEDURE — 99396 PREV VISIT EST AGE 40-64: CPT | Mod: 25

## 2021-01-13 PROCEDURE — 36415 COLL VENOUS BLD VENIPUNCTURE: CPT

## 2021-01-14 ENCOUNTER — LABORATORY RESULT (OUTPATIENT)
Age: 46
End: 2021-01-14

## 2021-01-14 PROBLEM — Z23 ENCOUNTER FOR IMMUNIZATION: Status: ACTIVE | Noted: 2020-01-09

## 2021-01-14 PROBLEM — H93.19 TINNITUS: Status: ACTIVE | Noted: 2021-01-14

## 2021-01-14 PROBLEM — M54.9 BACK PAIN, UNSPECIFIED BACK LOCATION, UNSPECIFIED BACK PAIN LATERALITY, UNSPECIFIED CHRONICITY: Status: ACTIVE | Noted: 2019-03-01

## 2021-01-14 PROBLEM — L67.9: Status: ACTIVE | Noted: 2019-03-01

## 2021-01-14 PROBLEM — Z13.220 ENCOUNTER FOR SCREENING FOR LIPID DISORDER: Status: RESOLVED | Noted: 2021-01-13 | Resolved: 2021-01-27

## 2021-01-14 NOTE — PLAN
[FreeTextEntry1] : cardiopulmonary  -Antiphospholipid disorder  MTHFR       We reviewed and discussed the EKG.   Patient was advised the importance of participating in aerobic exercise programs improve their stamina.  RADHIKA was advised there SOB/BALES was  more likely from cardiopulmonary deconditioning.  Patient was encourage to start an exercise program.  check labs.  Continue with ASA.  Check clotting factors.  Start metanax for MTHFR \par \par Neuro - seizure disorder - Continue current medication.  Advised to follow up with neuro.  \par \par Optho - Retinal eye occlusion -  Follow up with opthalmology.  Continue with ASA \par \par ENT - Tinnitus   - follow up with ENT\par \par Psych - Anxiety depression screening completed  - reviewed depression screening with the pt. Advised improvement since last screening was done.    Counseling given to the pt.  \par \par GYN -  Follow up with GYN for routine PAP.  Advised SBE - Check mammo \par IMMUNIZATION Record REVIEWED TB gold test done today \par see work physical form \par \par refused flu shot - education given \par \par Addendum reviewed pap smear see scan \par \par I spent 55 Minutes with the patient, half of which we discussed finding on physical exam  and coordinated care.  As well as reviewed my plans and follow ups.

## 2021-01-14 NOTE — HISTORY OF PRESENT ILLNESS
[FreeTextEntry1] : Physical  [de-identified] : Ms. BOYCE is a 45 year  female, who present to the office for yearly physical\par States she need medical formed filled out for work\par Denies having any health c/o\par States she would like to have cloting factor rechecked.\par Did see neuro early 12/20 for TIA symptoms  states exam was normal and since than no symptoms reoccurred \par Vision the same  since retinal eye occlusion

## 2021-01-14 NOTE — COUNSELING
[AUDIT-C Screening administered and reviewed] : AUDIT-C Screening administered and reviewed [Good understanding] : Patient has a good understanding of disease, goals and obesity follow-up plan

## 2021-01-14 NOTE — PHYSICAL EXAM
[Well Nourished] : well nourished [Well Developed] : well developed [Well-Appearing] : well-appearing [Normal Sclera/Conjunctiva] : normal sclera/conjunctiva [PERRL] : pupils equal round and reactive to light [EOMI] : extraocular movements intact [Normal Outer Ear/Nose] : the outer ears and nose were normal in appearance [Normal Oropharynx] : the oropharynx was normal [No JVD] : no jugular venous distention [No Lymphadenopathy] : no lymphadenopathy [Supple] : supple [Thyroid Normal, No Nodules] : the thyroid was normal and there were no nodules present [No Respiratory Distress] : no respiratory distress  [No Accessory Muscle Use] : no accessory muscle use [Clear to Auscultation] : lungs were clear to auscultation bilaterally [Normal Rate] : normal rate  [Regular Rhythm] : with a regular rhythm [Normal S1, S2] : normal S1 and S2 [No Carotid Bruits] : no carotid bruits [No Abdominal Bruit] : a ~M bruit was not heard ~T in the abdomen [Pedal Pulses Present] : the pedal pulses are present [No Edema] : there was no peripheral edema [No Palpable Aorta] : no palpable aorta [No Extremity Clubbing/Cyanosis] : no extremity clubbing/cyanosis [Soft] : abdomen soft [Non Tender] : non-tender [Non-distended] : non-distended [No Masses] : no abdominal mass palpated [No HSM] : no HSM [Normal Bowel Sounds] : normal bowel sounds [Normal Posterior Cervical Nodes] : no posterior cervical lymphadenopathy [Normal Anterior Cervical Nodes] : no anterior cervical lymphadenopathy [No CVA Tenderness] : no CVA  tenderness [No Spinal Tenderness] : no spinal tenderness [No Joint Swelling] : no joint swelling [Grossly Normal Strength/Tone] : grossly normal strength/tone [No Rash] : no rash [Coordination Grossly Intact] : coordination grossly intact [No Focal Deficits] : no focal deficits [Normal Gait] : normal gait [Deep Tendon Reflexes (DTR)] : deep tendon reflexes were 2+ and symmetric [Normal Affect] : the affect was normal [Normal Insight/Judgement] : insight and judgment were intact [Alert and Oriented x3] : oriented to person, place, and time [Normal Mood] : the mood was normal [de-identified] : flat

## 2021-01-14 NOTE — HEALTH RISK ASSESSMENT
[2 - 3 times a week (3 pts)] : 2 - 3  times a week (3 points) [1 or 2 (0 pts)] : 1 or 2 (0 points) [Never (0 pts)] : Never (0 points) [Patient reported PAP Smear was normal] : Patient reported PAP Smear was normal [Excellent] : ~his/her~ current health as excellent [Good] : ~his/her~  mood as  good [Yes] : Yes [No] : In the past 12 months have you used drugs other than those required for medical reasons? No [No falls in past year] : Patient reported no falls in the past year [HIV test declined] : HIV test declined [Hepatitis C test declined] : Hepatitis C test declined [With Family] : lives with family [# of Members in Household ___] :  household currently consist of [unfilled] member(s) [Employed] : employed [Graduate School] : graduate school [] :  [# Of Children ___] : has [unfilled] children [Sexually Active] : sexually active [Feels Safe at Home] : Feels safe at home [Fully functional (bathing, dressing, toileting, transferring, walking, feeding)] : Fully functional (bathing, dressing, toileting, transferring, walking, feeding) [Fully functional (using the telephone, shopping, preparing meals, housekeeping, doing laundry, using] : Fully functional and needs no help or supervision to perform IADLs (using the telephone, shopping, preparing meals, housekeeping, doing laundry, using transportation, managing medications and managing finances) [Reports normal functional visual acuity (ie: able to read med bottle)] : Reports normal functional visual acuity [Smoke Detector] : smoke detector [Carbon Monoxide Detector] : carbon monoxide detector [Seat Belt] :  uses seat belt [Patient/Caregiver not ready to engage] : Patient/Caregiver not ready to engage [1] : 2) Feeling down, depressed, or hopeless for several days (1) [] : No [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 Pineda  [de-identified] : Going at the end of the month  [AdvancecareDate] : 01/21

## 2021-01-14 NOTE — ASSESSMENT
[FreeTextEntry1] : A 45 year old female who present to the office for a work physical form to be filled out and evaluation of TB

## 2021-01-14 NOTE — REVIEW OF SYSTEMS
[Negative] : Heme/Lymph [Fever] : no fever [Fatigue] : no fatigue [Chills] : no chills [Night Sweats] : no night sweats [Recent Change In Weight] : ~T no recent weight change [Vision Problems] : no vision problems [Earache] : no earache [Hearing Loss] : no hearing loss [Nosebleed] : no nosebleeds [Sore Throat] : no sore throat [Postnasal Drip] : no postnasal drip [Shortness Of Breath] : no shortness of breath [Cough] : no cough [Dyspnea on Exertion] : no dyspnea on exertion [Abdominal Pain] : no abdominal pain [Nausea] : no nausea [Constipation] : no constipation [Diarrhea] : diarrhea [Vomiting] : no vomiting [Heartburn] : no heartburn [Melena] : no melena [Itching] : no itching [Nail Changes] : no nail changes [Hair Changes] : no hair changes [Skin Rash] : no skin rash [Headache] : no headache [Memory Loss] : no memory loss [Swollen Glands] : no swollen glands [FreeTextEntry4] : Tinnitus  [de-identified] : vertigo 11/20

## 2021-01-15 ENCOUNTER — RX CHANGE (OUTPATIENT)
Age: 46
End: 2021-01-15

## 2021-01-21 ENCOUNTER — RX CHANGE (OUTPATIENT)
Age: 46
End: 2021-01-21

## 2021-01-21 LAB
ANA SER IF-ACNC: NEGATIVE
APTT BLD: 26.9 SEC
DEPRECATED CARDIOLIPIN IGA SER: <5 APL
INR PPP: 1.04 RATIO
M TB IFN-G BLD-IMP: NEGATIVE
PROT C PPP CHRO-ACNC: 85 %
PROT S AG ACT/NOR PPP IA: 83 %
PT BLD: 12.3 SEC
QUANTIFERON TB PLUS MITOGEN MINUS NIL: >10 IU/ML
QUANTIFERON TB PLUS NIL: 0.05 IU/ML
QUANTIFERON TB PLUS TB1 MINUS NIL: -0.01 IU/ML
QUANTIFERON TB PLUS TB2 MINUS NIL: -0.01 IU/ML

## 2021-01-22 ENCOUNTER — NON-APPOINTMENT (OUTPATIENT)
Age: 46
End: 2021-01-22

## 2021-02-13 NOTE — ED ADULT NURSE NOTE - PRIMARY CARE PROVIDER
Patient:   DANIEL RUBIO            MRN: [id number]            FIN: [id number]               Age:   6 years     Sex:  Female     :  2013   Associated Diagnoses:   None   Author:   Елена ANDERSON, Gonsalo SEALS      Basic Information   Time seen: Date & time 2020 08:39:00.   History source: Patient, mother.   Arrival mode: Private vehicle.   History limitation: Patient's age.   Additional information: Chief Complaint from Nursing Triage Note : (Date Range: 2020 0:00 - 2020 8:39).      History of Present Illness   Patient is a 6-year-old female brought in by mother for a abdominal recheck.  Patient was having fevers with abdominal pain 1 day ago.  Patient had extensive work-up with unremarkable CT, labs, urine, and chest x-ray.  Patient's CT scan was unable to visualize appendix.  Patient had no leukocytosis or left shift.  Patient was instructed to coming to ED for reevaluation in 12 hours for repeat abdominal exam due to CT scan being unable to visualize appendix.  Since discharge patient has had resolution of abdominal pain without any nausea or vomiting.  Fever resolved..        Review of Systems   Constitutional symptoms:  No decreased activity,    Skin symptoms:  No rash,    Eye symptoms:  No discharge,    ENMT symptoms:  No nasal congestion,    Respiratory symptoms:  No shortness of breath,    Cardiovascular symptoms:  No history of heart murmurs.   Gastrointestinal symptoms:  No abdominal pain, no nausea, no vomiting, no diarrhea.    Genitourinary symptoms:  Normal urine output.   Musculoskeletal symptoms:  No joint swelling.   Neurologic symptoms:  No weakness.      Health Status   Allergies: No known allergies.   Medications: None.   Immunizations: Up to date.      Past Medical/ Family/ Social History      Medical history   Negative.   Surgical history: Negative.   Family history: Not significant.   Social history: Family/social situation: Intact family, lives with parent(s).   Problem  list: Per nurse's notes.      Physical Examination               Vital Signs   Vital Signs   1/24/2020 8:28           Temperature Oral          98.2 F                             Peripheral Pulse Rate     106 bpm                             Respiratory Rate          18 br/min  LOW                             Systolic Blood Pressure   98 mmHg                             Diastolic Blood Pressure  64 mmHg                             Mean Arterial Pressure    75 mmHg                             Oxygen Saturation         100 %    1/23/2020 19:48          Peripheral Pulse Rate     147 bpm  HI                             Systolic Blood Pressure   115 mmHg  HI                             Diastolic Blood Pressure  70 mmHg                             Mean Arterial Pressure    85 mmHg                             Oxygen Saturation         98 %    1/23/2020 19:30          Temperature Tympanic      102.3 F  HI                             Peripheral Pulse Rate     150 bpm  HI                             Respiratory Rate          20 br/min                             Oxygen Saturation         99 %    1/23/2020 16:18          Temperature Tympanic      101.8 F  HI                             Peripheral Pulse Rate     158 bpm  HI                             Respiratory Rate          22 br/min                             Systolic Blood Pressure   108 mmHg                             Diastolic Blood Pressure  71 mmHg                             Mean Arterial Pressure    83 mmHg                             Oxygen Saturation         99 %  .               Per nurse's notes.   Vital signs were reviewed.   Pulse Ox 99% on Room Air which is normal for this patient.   General:  Appropriate for age, no acute distress, Happy, smiling, interactive with examiner.    Skin:  Warm, dry.    Head:  Normocephalic, atraumatic.    Neck:  Supple, trachea midline, no tenderness, No nuchal rigidity.    Eye:  Pupils are equal, round and reactive to light,  extraocular movements are intact, normal conjunctiva.    Ears, nose, mouth and throat:  Tympanic membranes clear, oral mucosa moist, no pharyngeal erythema or exudate, No tenderness to bilateral mastoids.  Bilateral EACs are clear.  Bilateral TMs WNL.    No tonsillar hypertrophy.  Uvula midline.  Controlling oral secretions without difficulty.  No stridor..    Cardiovascular:  Regular rate and rhythm, No murmur, Normal peripheral perfusion, No edema, No use of accessory muscles, retractions, or tachypnea.  Speaking in full sentences without difficulty.    Respiratory:  Lungs are clear to auscultation, respirations are non-labored, breath sounds are equal, Symmetrical chest wall expansion.    Chest wall:  No deformity.   Back:  Nontender.   Musculoskeletal:  Normal ROM, normal strength, no tenderness, no swelling, no deformity.    Gastrointestinal:  Soft, Nontender, Non distended, Normal bowel sounds, No organomegaly, No tenderness to light or deep palpation in any quadrant of abdomen.  Patient jumping up and down without any abdominal pain..    Neurological:  No focal neurological deficits.   Lymphatics:  No lymphadenopathy.   Psychiatric:  Cooperative.      Medical Decision Making   Differential Diagnosis:  Abdominal pain, pelvic pain, chest pain, cellulitis, wound evaluation, pneumonia, pulmonary embolism.    Rationale:  Patient reexamined today.  Patient has no abdominal tenderness.  No fevers.  No nausea or vomiting.  Patient has pediatric appendicitis score of 0 presently.  No concern for appendicitis.  Labs, imaging reviewed from 1 day ago.  No indication for repeat labs or imaging as patient's fever and pain has fully resolved.  Advised mother to have child follow-up with pediatrician for repeat exam in 2 to 3 days and to return for any new or worsening symptoms..   Results review:  Lab results : Lab View   1/23/2020 17:25          Glucose Lvl               114 mg/dL  HI                             BUN                        9 mg/dL                             Creatinine                0.62 mg/dL                             eGFR NonAfrAmer           N/A, <18 yrs mL/min/1.73m2                             Sodium                    136 mEq/L                             Potassium                 3.9 mEq/L                             Chloride                  104 mEq/L                             TCO2                      20 mEq/L                             AGAP                      16 mEq/L                             Calcium                   9.9 mg/dL                             Alk Phos                  200 unit/L                             Bili Total                0.3 mg/dL                             Total Protein             8.3 g/dL                             Albumin                   4.5 g/dL                             Globulin_                 3.8 g/dL                             A/G Ratio_                1.2  NA                             AST/GOT                   40 unit/L  HI                             ALT/GPT                   18 unit/L                             Total CK                  66 U/L                             WBC                       4.3 K/cumm                             RBC                       4.88 M/cumm                             Hgb                       13.3 g/dL                             Hct                       39 %                             MCV                       80 FL                             MCH                       27 pg                             MCHC                      34 g/dL                             RDW                       12.3 %                             Platelet                  224 K/cumm                             NRBC                      0.0 %                             Abs Neutro                2.8 K/cumm                             Neutrophil                66 %  NA                             Abs Lymph                 0.9 K/cumm  LOW                              Lymphocyte                21 %  NA                             Abs Mono                  0.5 K/cumm                             Monocyte                  12 %  NA                             Immature Gran             0.5 %  HI                             Eosinophil                0 %                             Basophil                  0 %    1/23/2020 17:20          Culture Blood             See Report  (In Progress)   1/23/2020 17:15          UA Appear                 Clear                             UA Color                  Yellow                             UA pH                     6.0                             UA Spec Grav              1.015                             UA Glucose                Negative                             UA Bili                   Negative                             UA Ketones                Negative                             UA Blood                  Trace                             UA Protein                Negative                             UA Urobilinogen           0.2 mg/dL                             UA Nitrite                Negative                             UA Leuk Est               Negative                             UA Epithelial             None Seen                             UA RBC                    None Seen                             UA WBC                    None Seen                             UA Bacteria               None Seen                             Influenz A                Presumptive Neg                             Influenz B                Presumptive Neg                             Influenz Commnt           See Note                             Rapid Strep A             Negative                             RSV Screen                Negative  .   Radiology results:  FINDINGS: AP semiupright portable view of the chest obtained.  The cardiothymic shadow is normal in size and contour.   The cardiac apex, aortic arch, and  stomach are on the left.   The jean-paul and mediastinum are normal.  Pulmonary vascularity is normal.   The lungs are clear.   The costophrenic angles are sharp.   There is no evidence of air trapping.  Bowel gas pattern in the upper abdomen is unremarkable.  Bones, joints, and soft tissues are unremarkable.    IMPRESSION: Normal pediatric portable chest examination.  There is no  pneumothorax.      FINDINGS:   1.  The lung bases are clear.  The heart is normal in size.  The  distal esophagus and stomach are normal as visualized.  2.  The liver is normal in size and contour without focal mass lesion  or biliary ductal dilatation.  The portal and hepatic veins are widely  patent.  The gallbladder is normal as visualized.  3.  The pancreas and spleen have a normal appearance.  4.  The kidneys exhibit bilateral symmetric enhancement on early  venous phase imaging.  There is no mass, calcifications, or  hydronephrosis in either kidney.    5.  The adrenal glands are not enlarged.  6.  Both ureters have a normal appearance and the urinary bladder is  normal.  7.  The bowel is normal in caliber. There is no evidence of  perforation or obstruction.  There is no visible intraperitoneal fat,  which limits evaluation of the mesentery. There is no diverticulosis  or diverticulitis.  I do not see the appendix.  The mesenteric  arteries and veins remain widely patent.  8.  The retroperitoneal space is normal. There is no periportal or  periaortic lymphadenopathy. There is no retroperitoneal mass or fluid  collection.    9.  The pelvis  is unremarkable. There is no mass or fluid collection.  10.  The bones, joints, and soft tissues  are unremarkable.    IMPRESSION:  1.   I do not see any cause for the patient's symptoms on the basis of  this examination.    2.   Since I do not see the appendix, the possibility of appendicitis  cannot be completely excluded.  .      Impression and Plan   Diagnosis   1.  Well-child exam   Plan    Condition: Stable.    Disposition: Discharged: to home.    Patient was given the following educational materials: Medical Screening Exam, Medical Screening Exam.    Follow up with: Follow up with primary care provider Within 2 to 3 days Please follow-up with pediatrician for repeat exam in 2 to 3 days.  Please make sure she is drinking plenty fluids.  Please use Motrin/Tylenol as needed for pain/fever.  Please return for any new or worsening symptoms..    Counseled: Family, Regarding diagnosis.             Electronically Signed On 01.24.2020 08:43  ___________________________________________________   Gonsalo Shin     Dr. Zamora

## 2021-08-31 ENCOUNTER — TRANSCRIPTION ENCOUNTER (OUTPATIENT)
Age: 46
End: 2021-08-31

## 2022-01-14 ENCOUNTER — APPOINTMENT (OUTPATIENT)
Dept: INTERNAL MEDICINE | Facility: CLINIC | Age: 47
End: 2022-01-14
Payer: MEDICAID

## 2022-01-14 ENCOUNTER — NON-APPOINTMENT (OUTPATIENT)
Age: 47
End: 2022-01-14

## 2022-01-14 VITALS
TEMPERATURE: 97.9 F | OXYGEN SATURATION: 98 % | DIASTOLIC BLOOD PRESSURE: 70 MMHG | RESPIRATION RATE: 16 BRPM | BODY MASS INDEX: 19.61 KG/M2 | WEIGHT: 122 LBS | HEART RATE: 65 BPM | SYSTOLIC BLOOD PRESSURE: 102 MMHG | HEIGHT: 66 IN

## 2022-01-14 DIAGNOSIS — R31.9 HEMATURIA, UNSPECIFIED: ICD-10-CM

## 2022-01-14 DIAGNOSIS — J35.8 OTHER CHRONIC DISEASES OF TONSILS AND ADENOIDS: ICD-10-CM

## 2022-01-14 LAB
BILIRUB UR QL STRIP: NORMAL
CLARITY UR: CLEAR
COLLECTION METHOD: NORMAL
GLUCOSE UR-MCNC: NORMAL
HCG UR QL: 0.2 EU/DL
HGB UR QL STRIP.AUTO: ABNORMAL
KETONES UR-MCNC: NORMAL
LEUKOCYTE ESTERASE UR QL STRIP: NORMAL
NITRITE UR QL STRIP: NORMAL
PH UR STRIP: 7
PROT UR STRIP-MCNC: NORMAL
SP GR UR STRIP: 1.01

## 2022-01-14 PROCEDURE — 81003 URINALYSIS AUTO W/O SCOPE: CPT | Mod: QW

## 2022-01-14 PROCEDURE — G0442 ANNUAL ALCOHOL SCREEN 15 MIN: CPT

## 2022-01-14 PROCEDURE — 93000 ELECTROCARDIOGRAM COMPLETE: CPT | Mod: 59

## 2022-01-14 PROCEDURE — 99396 PREV VISIT EST AGE 40-64: CPT | Mod: 25

## 2022-01-14 RX ORDER — LEVOMEFOLATE CALCIUM 15 MG
TABLET ORAL
Refills: 0 | Status: DISCONTINUED | COMMUNITY
End: 2022-01-14

## 2022-01-14 RX ORDER — L-METHYLFOLATE-ALGAE-VIT B12-B6 CAP 3-90.314-2-35 MG 3-90.314-2-35 MG
3-90.314-2-35 CAP ORAL
Qty: 90 | Refills: 0 | Status: DISCONTINUED | COMMUNITY
Start: 2021-01-13 | End: 2022-01-14

## 2022-01-14 RX ORDER — LEVETIRACETAM 500 MG/1
500 TABLET, FILM COATED ORAL
Qty: 180 | Refills: 2 | Status: DISCONTINUED | COMMUNITY
Start: 2020-05-08 | End: 2022-01-14

## 2022-01-14 RX ORDER — ASPIRIN ENTERIC COATED TABLETS 81 MG 81 MG/1
81 TABLET, DELAYED RELEASE ORAL
Qty: 90 | Refills: 3 | Status: ACTIVE | COMMUNITY
Start: 2021-01-13 | End: 1900-01-01

## 2022-01-14 RX ORDER — ALPRAZOLAM 0.25 MG/1
0.25 TABLET ORAL
Qty: 30 | Refills: 0 | Status: ACTIVE | COMMUNITY
Start: 1900-01-01 | End: 1900-01-01

## 2022-01-14 NOTE — PLAN
[FreeTextEntry1] : cardiopulmonary  -Antiphospholipid disorder  MTHFR       We reviewed and discussed the EKG.   Patient was advised the importance of participating in aerobic exercise programs improve their stamina.  RADHIKA was   advised to make sure she take her  ASA 81 mg daily.\par \par Neuro - seizure disorder - Continue current medication.  Advised to follow up with neuro.  Check Keppra levels \par \par Optho - Retinal eye occlusion -  Follow up with opthalmology.  Continue with ASA 81 mg daily.\par \par ENT - Tinnitus   - Resolved\par left tonsillar cyst  referral to ENT for removal \par \par Psych - Anxiety depression screening completed  - reviewed depression screening with the pt. Advised improvement since last screening was done.    Counseling given to the pt.  \par \par GYN -  Follow up with GYN for routine PAP.  Advised SBE - Check mammo \par call vikram for prior mammo report \par \par IMMUNIZATION Record REVIEWED TB gold test done today \par see work physical form \par \par \par Patient  education  - COVID-19   Counseling and education provided to the patient.  Advised sign and symptoms of the virus.  Advised contact precautions.  Educated patient on proper hand washing and to participate in social distancing.  covid vax x 2 \par \par Patient is in full awareness of the plan and agrees to it.  All pt question was answered.  \par \par

## 2022-01-14 NOTE — PHYSICAL EXAM
[Well Nourished] : well nourished [Well Developed] : well developed [Well-Appearing] : well-appearing [Normal Sclera/Conjunctiva] : normal sclera/conjunctiva [PERRL] : pupils equal round and reactive to light [EOMI] : extraocular movements intact [Normal Outer Ear/Nose] : the outer ears and nose were normal in appearance [Normal Oropharynx] : the oropharynx was normal [No JVD] : no jugular venous distention [No Lymphadenopathy] : no lymphadenopathy [Supple] : supple [Thyroid Normal, No Nodules] : the thyroid was normal and there were no nodules present [No Respiratory Distress] : no respiratory distress  [No Accessory Muscle Use] : no accessory muscle use [Clear to Auscultation] : lungs were clear to auscultation bilaterally [Normal Rate] : normal rate  [Regular Rhythm] : with a regular rhythm [Normal S1, S2] : normal S1 and S2 [No Carotid Bruits] : no carotid bruits [No Abdominal Bruit] : a ~M bruit was not heard ~T in the abdomen [Pedal Pulses Present] : the pedal pulses are present [No Edema] : there was no peripheral edema [No Palpable Aorta] : no palpable aorta [No Extremity Clubbing/Cyanosis] : no extremity clubbing/cyanosis [Soft] : abdomen soft [Non Tender] : non-tender [Non-distended] : non-distended [No Masses] : no abdominal mass palpated [No HSM] : no HSM [Normal Bowel Sounds] : normal bowel sounds [Normal Posterior Cervical Nodes] : no posterior cervical lymphadenopathy [Normal Anterior Cervical Nodes] : no anterior cervical lymphadenopathy [No CVA Tenderness] : no CVA  tenderness [No Spinal Tenderness] : no spinal tenderness [No Joint Swelling] : no joint swelling [Grossly Normal Strength/Tone] : grossly normal strength/tone [No Rash] : no rash [Coordination Grossly Intact] : coordination grossly intact [No Focal Deficits] : no focal deficits [Normal Gait] : normal gait [Deep Tendon Reflexes (DTR)] : deep tendon reflexes were 2+ and symmetric [Normal Affect] : the affect was normal [Alert and Oriented x3] : oriented to person, place, and time [Normal Mood] : the mood was normal [Normal Insight/Judgement] : insight and judgment were intact [No Acute Distress] : no acute distress [Speech Grossly Normal] : speech grossly normal [de-identified] : left whitish cyst on left tonsil area  [de-identified] : flat [de-identified] : as per gyn

## 2022-01-14 NOTE — HEALTH RISK ASSESSMENT
[Excellent] : ~his/her~ current health as excellent [Good] : ~his/her~  mood as  good [Yes] : Yes [2 - 3 times a week (3 pts)] : 2 - 3  times a week (3 points) [1 or 2 (0 pts)] : 1 or 2 (0 points) [Never (0 pts)] : Never (0 points) [No] : In the past 12 months have you used drugs other than those required for medical reasons? No [No falls in past year] : Patient reported no falls in the past year [1] : 2) Feeling down, depressed, or hopeless for several days (1) [Patient reported PAP Smear was normal] : Patient reported PAP Smear was normal [HIV test declined] : HIV test declined [Hepatitis C test declined] : Hepatitis C test declined [With Family] : lives with family [# of Members in Household ___] :  household currently consist of [unfilled] member(s) [Employed] : employed [Graduate School] : graduate school [] :  [# Of Children ___] : has [unfilled] children [Sexually Active] : sexually active [Feels Safe at Home] : Feels safe at home [Reports normal functional visual acuity (ie: able to read med bottle)] : Reports normal functional visual acuity [Smoke Detector] : smoke detector [Carbon Monoxide Detector] : carbon monoxide detector [Seat Belt] :  uses seat belt [Never] : Never [PHQ-2 Positive] : PHQ-2 Positive [No Retinopathy] : No retinopathy [Fully functional (bathing, dressing, toileting, transferring, walking, feeding)] : Fully functional (bathing, dressing, toileting, transferring, walking, feeding) [Fully functional (using the telephone, shopping, preparing meals, housekeeping, doing laundry, using] : Fully functional and needs no help or supervision to perform IADLs (using the telephone, shopping, preparing meals, housekeeping, doing laundry, using transportation, managing medications and managing finances) [Several Days (1)] : 7.) Trouble concentrating on things, such as reading a newspaper or watching television? Several days [Not at All (0)] : 8.) Moving or speaking so slowly that other people could have noticed, or the opposite, moving or speaking faster than usual? Not at all [Mild] : severity of depression is mild [Not at all] : How difficult have these problems made it for you to do your work, take care of things at home, or get along with people? 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 Pineda  [de-identified] : Going at the end of the month

## 2022-01-14 NOTE — REVIEW OF SYSTEMS
[Anxiety] : anxiety [Negative] : Neurological [Fever] : no fever [Chills] : no chills [Fatigue] : no fatigue [Night Sweats] : no night sweats [Recent Change In Weight] : ~T no recent weight change [Vision Problems] : no vision problems [Earache] : no earache [Hearing Loss] : no hearing loss [Nosebleed] : no nosebleeds [Sore Throat] : no sore throat [Postnasal Drip] : no postnasal drip [Shortness Of Breath] : no shortness of breath [Cough] : no cough [Dyspnea on Exertion] : no dyspnea on exertion [Abdominal Pain] : no abdominal pain [Nausea] : no nausea [Constipation] : no constipation [Diarrhea] : diarrhea [Vomiting] : no vomiting [Heartburn] : no heartburn [Melena] : no melena [Dysuria] : no dysuria [Nocturia] : no nocturia [Hematuria] : no hematuria [Vaginal Discharge] : no vaginal discharge [Joint Pain] : no joint pain [Joint Stiffness] : no joint stiffness [Muscle Pain] : no muscle pain [Itching] : no itching [Nail Changes] : no nail changes [Hair Changes] : no hair changes [Skin Rash] : no skin rash [Headache] : no headache [Memory Loss] : no memory loss [Swollen Glands] : no swollen glands

## 2022-01-14 NOTE — PAST MEDICAL HISTORY
[Menstruating] : menstruating [Total Preg ___] : G[unfilled] [Live Births ___] : P[unfilled]  [Full Term ___] : Full Term: [unfilled] [Living ___] : Living: [unfilled] [Definite ___ (Date)] : the last menstrual period was [unfilled]

## 2022-01-14 NOTE — HISTORY OF PRESENT ILLNESS
[FreeTextEntry1] : Physical  [de-identified] : Ms. BOYCE is a 46 year  female, who present to the office for yearly physical\par States she need medical formed filled out for work\par Denies having any health c/o\par Last seizure was 3 years ago has an appointment with neuro in the next few weeks \par Requesting a renewal of xanax  takes prn xanaxiety\par had a recent covid infection feels fine

## 2022-01-19 ENCOUNTER — NON-APPOINTMENT (OUTPATIENT)
Age: 47
End: 2022-01-19

## 2022-01-20 LAB
25(OH)D3 SERPL-MCNC: 25.8 NG/ML
ALBUMIN SERPL ELPH-MCNC: 4.8 G/DL
ALP BLD-CCNC: 46 U/L
ALT SERPL-CCNC: 15 U/L
ANION GAP SERPL CALC-SCNC: 13 MMOL/L
AST SERPL-CCNC: 23 U/L
BACTERIA UR CULT: NORMAL
BASOPHILS # BLD AUTO: 0.04 K/UL
BASOPHILS NFR BLD AUTO: 1 %
BILIRUB SERPL-MCNC: 0.3 MG/DL
BUN SERPL-MCNC: 14 MG/DL
CALCIUM SERPL-MCNC: 9.8 MG/DL
CHLORIDE SERPL-SCNC: 102 MMOL/L
CHOLEST SERPL-MCNC: 191 MG/DL
CO2 SERPL-SCNC: 26 MMOL/L
CREAT SERPL-MCNC: 0.9 MG/DL
EOSINOPHIL # BLD AUTO: 0.3 K/UL
EOSINOPHIL NFR BLD AUTO: 7.5 %
GLUCOSE SERPL-MCNC: 92 MG/DL
HCT VFR BLD CALC: 43.3 %
HDLC SERPL-MCNC: 73 MG/DL
HGB BLD-MCNC: 13.9 G/DL
IMM GRANULOCYTES NFR BLD AUTO: 0.2 %
LDLC SERPL CALC-MCNC: 104 MG/DL
LYMPHOCYTES # BLD AUTO: 1.61 K/UL
LYMPHOCYTES NFR BLD AUTO: 40 %
M TB IFN-G BLD-IMP: NEGATIVE
MAN DIFF?: NORMAL
MCHC RBC-ENTMCNC: 32.1 GM/DL
MCHC RBC-ENTMCNC: 32.9 PG
MCV RBC AUTO: 102.6 FL
MONOCYTES # BLD AUTO: 0.45 K/UL
MONOCYTES NFR BLD AUTO: 11.2 %
NEUTROPHILS # BLD AUTO: 1.61 K/UL
NEUTROPHILS NFR BLD AUTO: 40.1 %
NONHDLC SERPL-MCNC: 118 MG/DL
PLATELET # BLD AUTO: 229 K/UL
POTASSIUM SERPL-SCNC: 4.4 MMOL/L
PROT SERPL-MCNC: 7.2 G/DL
QUANTIFERON TB PLUS MITOGEN MINUS NIL: 9.93 IU/ML
QUANTIFERON TB PLUS NIL: 0.07 IU/ML
QUANTIFERON TB PLUS TB1 MINUS NIL: -0.02 IU/ML
QUANTIFERON TB PLUS TB2 MINUS NIL: -0.04 IU/ML
RBC # BLD: 4.22 M/UL
RBC # FLD: 11.8 %
SODIUM SERPL-SCNC: 141 MMOL/L
TRIGL SERPL-MCNC: 70 MG/DL
TSH SERPL-ACNC: 1.73 UIU/ML
WBC # FLD AUTO: 4.02 K/UL

## 2022-01-24 LAB — LEVETIRACETAM SERPL-MCNC: 7.4 UG/ML

## 2022-01-26 ENCOUNTER — APPOINTMENT (OUTPATIENT)
Dept: NEUROLOGY | Facility: CLINIC | Age: 47
End: 2022-01-26
Payer: MEDICAID

## 2022-01-26 VITALS
WEIGHT: 122 LBS | HEART RATE: 65 BPM | DIASTOLIC BLOOD PRESSURE: 74 MMHG | SYSTOLIC BLOOD PRESSURE: 111 MMHG | BODY MASS INDEX: 19.61 KG/M2 | HEIGHT: 66 IN

## 2022-01-26 PROCEDURE — 99214 OFFICE O/P EST MOD 30 MIN: CPT

## 2022-01-26 NOTE — PHYSICAL EXAM
[FreeTextEntry1] : alert and oriented x3\par attn conc lang nl\par STM 2/3 at 5 mins\par CN intact in detail\par DTRs 3 and symmetric\par motor- 5/5\par sens- intact in detail\par CSG wnl. \par

## 2022-01-26 NOTE — HISTORY OF PRESENT ILLNESS
[FreeTextEntry1] : cc- seizures\par \par HPI- Since last visit had no auras, seizures, syncope or focal sxs until Thursday at 9AM- driving to work feeling fine when suddenly got an odd feeling in her head, light headed but felt more unusual than that.\par Three weeks ago had COVID.\par Since has had chest palps- EKG ok.  \par \par meds- alprazolam 0.25 (very rare)\par Levetiracetam 500-500\par baby ASA \par

## 2022-01-26 NOTE — ASSESSMENT
[FreeTextEntry1] : \par A/p-\par Possible partial seizure in past as well as a witnessed GTC in sleep. All occurred in context of a dx of anti-phospholipid syndrome and a CRV occlusion. Now stable on baby ASA. \par One possible recent aura at 9AM - Lev trough low.  \par  - Increase Lev 500 - 750\par \par 2. intermittent tinnitus- resolved.

## 2022-03-03 DIAGNOSIS — M79.643 PAIN IN UNSPECIFIED HAND: ICD-10-CM

## 2022-04-22 NOTE — ED PROVIDER NOTE - CPE EDP HEME LYMPH NORM
Patient is in the lateral left side position. The body was positioned using the following devices: wedge.   Pt positioned self independently normal...

## 2022-05-25 ENCOUNTER — APPOINTMENT (OUTPATIENT)
Dept: OTOLARYNGOLOGY | Facility: CLINIC | Age: 47
End: 2022-05-25
Payer: MEDICAID

## 2022-05-25 VITALS
DIASTOLIC BLOOD PRESSURE: 79 MMHG | SYSTOLIC BLOOD PRESSURE: 124 MMHG | HEART RATE: 50 BPM | WEIGHT: 125 LBS | HEIGHT: 66 IN | BODY MASS INDEX: 20.09 KG/M2

## 2022-05-25 DIAGNOSIS — J35.8 OTHER CHRONIC DISEASES OF TONSILS AND ADENOIDS: ICD-10-CM

## 2022-05-25 PROCEDURE — 99203 OFFICE O/P NEW LOW 30 MIN: CPT

## 2022-05-25 NOTE — HISTORY OF PRESENT ILLNESS
[de-identified] : LEFT TONSIL CYST NOTICED FOR MORE THAN 2 YEARS\par NO DYSPHAGIA OR PAIN\par SOMETIME FELT ESPECIALLY WITH COLD SYMPTOMS\par MEDICAL HX REVIEWED

## 2022-05-25 NOTE — ASSESSMENT
[FreeTextEntry1] : TONSIL CYST\par BENIGN LOOKING IN PRESENTATION AND EXAM\par CONSERVATIVE MANAGEMENT\par MAY CONSIDER I &D IF SYMPTOMATIC\par GARGLING WITH SALT AND WATER

## 2022-05-25 NOTE — PHYSICAL EXAM
[Normal] : mucosa is normal [Midline] : trachea located in midline position [de-identified] : LEFT TONSIL RETENTION CYST/ BENIGN LOOKING / ABOUT 5 TO 6 MM/ ANOTHER SMALLER ONE ABOUT 2 MM/ NON TENDER IN PALPATION AND CYSTIC

## 2022-07-14 ENCOUNTER — NON-APPOINTMENT (OUTPATIENT)
Age: 47
End: 2022-07-14

## 2022-12-11 ENCOUNTER — NON-APPOINTMENT (OUTPATIENT)
Age: 47
End: 2022-12-11

## 2023-01-16 ENCOUNTER — RESULT CHARGE (OUTPATIENT)
Age: 48
End: 2023-01-16

## 2023-01-17 ENCOUNTER — NON-APPOINTMENT (OUTPATIENT)
Age: 48
End: 2023-01-17

## 2023-01-17 ENCOUNTER — APPOINTMENT (OUTPATIENT)
Dept: INTERNAL MEDICINE | Facility: CLINIC | Age: 48
End: 2023-01-17
Payer: MEDICAID

## 2023-01-17 VITALS
HEART RATE: 58 BPM | OXYGEN SATURATION: 99 % | TEMPERATURE: 98 F | RESPIRATION RATE: 17 BRPM | HEIGHT: 66 IN | SYSTOLIC BLOOD PRESSURE: 112 MMHG | BODY MASS INDEX: 19.77 KG/M2 | WEIGHT: 123 LBS | DIASTOLIC BLOOD PRESSURE: 80 MMHG

## 2023-01-17 DIAGNOSIS — R42 DIZZINESS AND GIDDINESS: ICD-10-CM

## 2023-01-17 DIAGNOSIS — Z12.11 ENCOUNTER FOR SCREENING FOR MALIGNANT NEOPLASM OF COLON: ICD-10-CM

## 2023-01-17 DIAGNOSIS — Z11.1 ENCOUNTER FOR SCREENING FOR RESPIRATORY TUBERCULOSIS: ICD-10-CM

## 2023-01-17 DIAGNOSIS — Z82.62 FAMILY HISTORY OF OSTEOPOROSIS: ICD-10-CM

## 2023-01-17 LAB
BILIRUB UR QL STRIP: NEGATIVE
CLARITY UR: CLEAR
COLLECTION METHOD: NORMAL
GLUCOSE UR-MCNC: NEGATIVE
HCG UR QL: 0.2 EU/DL
HGB UR QL STRIP.AUTO: NEGATIVE
KETONES UR-MCNC: NEGATIVE
LEUKOCYTE ESTERASE UR QL STRIP: NEGATIVE
NITRITE UR QL STRIP: NEGATIVE
PH UR STRIP: 7
PROT UR STRIP-MCNC: NEGATIVE
SP GR UR STRIP: 1.02

## 2023-01-17 PROCEDURE — 81003 URINALYSIS AUTO W/O SCOPE: CPT | Mod: QW

## 2023-01-17 PROCEDURE — 93000 ELECTROCARDIOGRAM COMPLETE: CPT | Mod: 59

## 2023-01-17 PROCEDURE — 99396 PREV VISIT EST AGE 40-64: CPT | Mod: 25

## 2023-01-17 RX ORDER — AZITHROMYCIN 250 MG/1
250 TABLET, FILM COATED ORAL
Qty: 6 | Refills: 0 | Status: COMPLETED | COMMUNITY
Start: 2022-12-12

## 2023-01-18 PROBLEM — Z82.62 FAMILY HISTORY OF OSTEOPOROSIS: Status: ACTIVE | Noted: 2023-01-17

## 2023-01-18 PROBLEM — Z11.1 SCREENING FOR TUBERCULOSIS: Status: ACTIVE | Noted: 2020-01-09

## 2023-01-18 NOTE — COUNSELING
[AUDIT-C Screening administered and reviewed] : AUDIT-C Screening administered and reviewed [Good understanding] : Patient has a good understanding of disease, goals and obesity follow-up plan [Encouraged to increase physical activity] : Encouraged to increase physical activity [FreeTextEntry2] :  encourage patient increase fluid intake

## 2023-01-18 NOTE — PHYSICAL EXAM
[Well Nourished] : well nourished [Well Developed] : well developed [Well-Appearing] : well-appearing [Normal Sclera/Conjunctiva] : normal sclera/conjunctiva [PERRL] : pupils equal round and reactive to light [EOMI] : extraocular movements intact [Normal Outer Ear/Nose] : the outer ears and nose were normal in appearance [Normal Oropharynx] : the oropharynx was normal [No JVD] : no jugular venous distention [No Lymphadenopathy] : no lymphadenopathy [Supple] : supple [Thyroid Normal, No Nodules] : the thyroid was normal and there were no nodules present [No Respiratory Distress] : no respiratory distress  [No Accessory Muscle Use] : no accessory muscle use [Clear to Auscultation] : lungs were clear to auscultation bilaterally [Normal Rate] : normal rate  [Regular Rhythm] : with a regular rhythm [Normal S1, S2] : normal S1 and S2 [No Carotid Bruits] : no carotid bruits [No Abdominal Bruit] : a ~M bruit was not heard ~T in the abdomen [Pedal Pulses Present] : the pedal pulses are present [No Edema] : there was no peripheral edema [No Palpable Aorta] : no palpable aorta [No Extremity Clubbing/Cyanosis] : no extremity clubbing/cyanosis [Soft] : abdomen soft [Non Tender] : non-tender [Non-distended] : non-distended [No Masses] : no abdominal mass palpated [No HSM] : no HSM [Normal Bowel Sounds] : normal bowel sounds [Normal Posterior Cervical Nodes] : no posterior cervical lymphadenopathy [Normal Anterior Cervical Nodes] : no anterior cervical lymphadenopathy [No CVA Tenderness] : no CVA  tenderness [No Spinal Tenderness] : no spinal tenderness [No Joint Swelling] : no joint swelling [Grossly Normal Strength/Tone] : grossly normal strength/tone [No Rash] : no rash [Coordination Grossly Intact] : coordination grossly intact [No Focal Deficits] : no focal deficits [Normal Gait] : normal gait [Deep Tendon Reflexes (DTR)] : deep tendon reflexes were 2+ and symmetric [Normal Affect] : the affect was normal [Alert and Oriented x3] : oriented to person, place, and time [Normal Mood] : the mood was normal [Normal Insight/Judgement] : insight and judgment were intact [No Acute Distress] : no acute distress [Normal TMs] : both tympanic membranes were normal [de-identified] : w/ murmur 1/6 [de-identified] :  as per gynecology [de-identified] : flat [FreeTextEntry1] :  as per gynecology [de-identified] :  as per gynecology [de-identified] :  negative Romberg

## 2023-01-18 NOTE — PLAN
[FreeTextEntry1] : Cardiopulmonary  -Antiphospholipid disorder  MTHFR       We reviewed and discussed the EKG.   Patient was advised the importance of participating in aerobic exercise programs improve their stamina.  RADHIKA was encourage to start an exercise program.  check labs.  Continue with ASA.  \par \par Neuro - seizure disorder - Continue current medication.  Advised to follow up with neuro  and discuss increasing dizziness and possible aura prior to the dizziness.  Consider EEG during her cycle.    Check MRI of the brain.  Check labs for anemia.  Advised patient increase fluid during her cycle to see if it helps\par  check Keppra levels.\par \par Optho - Retinal eye occlusion -  Follow up with opthalmology.  Continue with ASA \par \par ENT - Tinnitus   -  patient states tinnitus has improved.\par \par Psych - Anxiety depression screening completed  -  Counseling given to the patient.\par \par GYN -  Follow up with GYN for routine PAP.  Advised SBE - \par   Advised patient to have yearly clinical breast exam done by gynecology.  Monitor mammogram on a yearly basis.\par \par IMMUNIZATION-   TB screening check TB Gold test.\par \par Immunization - Flu vaccination discussed. Education provided - Pt declined on today visit \par \par I spent 52 Minutes with the patient, half of which we discussed finding on physical exam  and coordinated care.  As well as reviewed my plans and follow ups.\par Dragon speech recognition software was used to create portions of this document.  An attempt at proofreading has been made to minimize errors please call if any questions arise.

## 2023-01-18 NOTE — HISTORY OF PRESENT ILLNESS
[FreeTextEntry1] : Physical Exam. [de-identified] : Mrs. BOYCE is a 47 year  female, with a past medical history as noted below,who present to the office  today for   Physical exam.\par States   she needs a TB screening for her employment.\par   States for the last few months she has been having dizzy spells during her menstrual cycles.  Does state that her menstrual cycles have changed slightly and they are little bit more heavier the first day or so.  Cannot really explain the dizziness states it lasts about 10 to 15 seconds then resolves unaware what makes it better or worse.  Only associated with her menstrual cycle.  Not sure if she has an aura prior to the dizziness starting.  Did discuss it with her neurologist who increased her Keppra but has not followed back up with her neurologist since then.  Currently taking Keppra 500 mg in the morning and 750 mg at nighttime.  During her menstrual period she has tried 750 mg twice daily.  Patient denies any improvement in the symptoms.  Patient also states when she plays tennis she has not really noticed any dizziness except 1 time with her menstrual cycle\par

## 2023-01-18 NOTE — REVIEW OF SYSTEMS
[Sore Throat] : sore throat [Palpitations] : palpitations [Joint Stiffness] : joint stiffness [Muscle Pain] : muscle pain [Dizziness] : dizziness [Anxiety] : anxiety [Easy Bruising] : easy bruising [Negative] : Neurological [Fever] : no fever [Chills] : no chills [Fatigue] : no fatigue [Night Sweats] : no night sweats [Recent Change In Weight] : ~T no recent weight change [Pain] : no pain [Vision Problems] : no vision problems [Itching] : no itching [Earache] : no earache [Hearing Loss] : no hearing loss [Nosebleed] : no nosebleeds [Nasal Discharge] : no nasal discharge [Postnasal Drip] : no postnasal drip [Chest Pain] : no chest pain [Leg Claudication] : no leg claudication [Lower Ext Edema] : no lower extremity edema [Shortness Of Breath] : no shortness of breath [Wheezing] : no wheezing [Cough] : no cough [Dyspnea on Exertion] : no dyspnea on exertion [Abdominal Pain] : no abdominal pain [Nausea] : no nausea [Constipation] : no constipation [Diarrhea] : diarrhea [Vomiting] : no vomiting [Heartburn] : no heartburn [Melena] : no melena [Dysuria] : no dysuria [Incontinence] : no incontinence [Nocturia] : no nocturia [Hematuria] : no hematuria [Frequency] : no frequency [Back Pain] : no back pain [Itching] : no itching [Nail Changes] : no nail changes [Hair Changes] : no hair changes [Skin Rash] : no skin rash [Headache] : no headache [Fainting] : no fainting [Confusion] : no confusion [Memory Loss] : no memory loss [Depression] : no depression [Easy Bleeding] : no easy bleeding [Swollen Glands] : no swollen glands [de-identified] : on asa

## 2023-01-18 NOTE — ASSESSMENT
[FreeTextEntry1] : A 47 year old female who present to the office for a work physical form to be filled out and evaluation of TB

## 2023-01-18 NOTE — HEALTH RISK ASSESSMENT
[Good] : ~his/her~  mood as  good [Yes] : Yes [1 or 2 (0 pts)] : 1 or 2 (0 points) [Never (0 pts)] : Never (0 points) [No] : In the past 12 months have you used drugs other than those required for medical reasons? No [No falls in past year] : Patient reported no falls in the past year [1] : 1) Little interest or pleasure doing things for several days (1) [Patient reported PAP Smear was normal] : Patient reported PAP Smear was normal [HIV test declined] : HIV test declined [Hepatitis C test declined] : Hepatitis C test declined [With Family] : lives with family [# of Members in Household ___] :  household currently consist of [unfilled] member(s) [Employed] : employed [Graduate School] : graduate school [] :  [# Of Children ___] : has [unfilled] children [Sexually Active] : sexually active [Reports normal functional visual acuity (ie: able to read med bottle)] : Reports normal functional visual acuity [Smoke Detector] : smoke detector [Carbon Monoxide Detector] : carbon monoxide detector [Seat Belt] :  uses seat belt [Very Good] : ~his/her~ current health as very good [Intercurrent Urgi Care visits] : went to urgent care [Former] : Former [2 - 4 times a month (2 pts)] : 2-4 times a month (2 points) [0] : 2) Feeling down, depressed, or hopeless: Not at all (0) [No Retinopathy] : No retinopathy [None] : None [Feels Safe at Home] : Feels safe at home [Fully functional (bathing, dressing, toileting, transferring, walking, feeding)] : Fully functional (bathing, dressing, toileting, transferring, walking, feeding) [Fully functional (using the telephone, shopping, preparing meals, housekeeping, doing laundry, using] : Fully functional and needs no help or supervision to perform IADLs (using the telephone, shopping, preparing meals, housekeeping, doing laundry, using transportation, managing medications and managing finances) [FreeTextEntry1] : dizzy with periods  -   [de-identified] : pink eye  [de-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 Pineda  going 2/13/23  [ColonoscopyComments] : referred today 01/23

## 2023-01-20 ENCOUNTER — NON-APPOINTMENT (OUTPATIENT)
Age: 48
End: 2023-01-20

## 2023-01-20 LAB
25(OH)D3 SERPL-MCNC: 29.3 NG/ML
ALBUMIN SERPL ELPH-MCNC: 4.6 G/DL
ALP BLD-CCNC: 46 U/L
ALT SERPL-CCNC: 11 U/L
ANION GAP SERPL CALC-SCNC: 9 MMOL/L
AST SERPL-CCNC: 18 U/L
BASOPHILS # BLD AUTO: 0.05 K/UL
BASOPHILS NFR BLD AUTO: 0.8 %
BILIRUB SERPL-MCNC: 0.4 MG/DL
BUN SERPL-MCNC: 11 MG/DL
CALCIUM SERPL-MCNC: 9.9 MG/DL
CHLORIDE SERPL-SCNC: 101 MMOL/L
CHOLEST SERPL-MCNC: 218 MG/DL
CO2 SERPL-SCNC: 29 MMOL/L
CREAT SERPL-MCNC: 0.79 MG/DL
CRP SERPL HS-MCNC: 0.34 MG/L
EGFR: 93 ML/MIN/1.73M2
EOSINOPHIL # BLD AUTO: 0.13 K/UL
EOSINOPHIL NFR BLD AUTO: 2 %
FERRITIN SERPL-MCNC: 43 NG/ML
GLUCOSE SERPL-MCNC: 91 MG/DL
HCT VFR BLD CALC: 41.7 %
HDLC SERPL-MCNC: 90 MG/DL
HGB BLD-MCNC: 14.3 G/DL
IMM GRANULOCYTES NFR BLD AUTO: 0.2 %
IRON SATN MFR SERPL: 35 %
IRON SERPL-MCNC: 116 UG/DL
LDLC SERPL CALC-MCNC: 112 MG/DL
LYMPHOCYTES # BLD AUTO: 2.11 K/UL
LYMPHOCYTES NFR BLD AUTO: 32.4 %
MAN DIFF?: NORMAL
MCHC RBC-ENTMCNC: 34.3 GM/DL
MCHC RBC-ENTMCNC: 34.6 PG
MCV RBC AUTO: 101 FL
MONOCYTES # BLD AUTO: 0.54 K/UL
MONOCYTES NFR BLD AUTO: 8.3 %
NEUTROPHILS # BLD AUTO: 3.67 K/UL
NEUTROPHILS NFR BLD AUTO: 56.3 %
NONHDLC SERPL-MCNC: 127 MG/DL
PLATELET # BLD AUTO: 248 K/UL
POTASSIUM SERPL-SCNC: 4.4 MMOL/L
PROT SERPL-MCNC: 7.2 G/DL
RBC # BLD: 4.13 M/UL
RBC # FLD: 12 %
SODIUM SERPL-SCNC: 140 MMOL/L
T4 FREE SERPL-MCNC: 1.1 NG/DL
TIBC SERPL-MCNC: 335 UG/DL
TRIGL SERPL-MCNC: 76 MG/DL
TSH SERPL-ACNC: 1.91 UIU/ML
UIBC SERPL-MCNC: 219 UG/DL
VIT B12 SERPL-MCNC: 563 PG/ML
WBC # FLD AUTO: 6.51 K/UL

## 2023-01-23 LAB
LEVETIRACETAM SERPL-MCNC: 16.2 UG/ML
M TB IFN-G BLD-IMP: NEGATIVE
QUANTIFERON TB PLUS MITOGEN MINUS NIL: 9.37 IU/ML
QUANTIFERON TB PLUS NIL: 0.03 IU/ML
QUANTIFERON TB PLUS TB1 MINUS NIL: 0 IU/ML
QUANTIFERON TB PLUS TB2 MINUS NIL: -0.01 IU/ML

## 2023-01-24 ENCOUNTER — NON-APPOINTMENT (OUTPATIENT)
Age: 48
End: 2023-01-24

## 2023-03-10 ENCOUNTER — OFFICE (OUTPATIENT)
Dept: URBAN - METROPOLITAN AREA CLINIC 70 | Facility: CLINIC | Age: 48
Setting detail: OPHTHALMOLOGY
End: 2023-03-10
Payer: MEDICAID

## 2023-03-10 VITALS — HEIGHT: 55 IN

## 2023-03-10 DIAGNOSIS — H34.8122: ICD-10-CM

## 2023-03-10 DIAGNOSIS — G40.909: ICD-10-CM

## 2023-03-10 DIAGNOSIS — H52.7: ICD-10-CM

## 2023-03-10 DIAGNOSIS — H01.005: ICD-10-CM

## 2023-03-10 DIAGNOSIS — H01.002: ICD-10-CM

## 2023-03-10 DIAGNOSIS — H01.001: ICD-10-CM

## 2023-03-10 DIAGNOSIS — H01.004: ICD-10-CM

## 2023-03-10 DIAGNOSIS — H16.223: ICD-10-CM

## 2023-03-10 PROCEDURE — 92250 FUNDUS PHOTOGRAPHY W/I&R: CPT | Performed by: STUDENT IN AN ORGANIZED HEALTH CARE EDUCATION/TRAINING PROGRAM

## 2023-03-10 PROCEDURE — 92014 COMPRE OPH EXAM EST PT 1/>: CPT | Performed by: STUDENT IN AN ORGANIZED HEALTH CARE EDUCATION/TRAINING PROGRAM

## 2023-03-10 PROCEDURE — 92015 DETERMINE REFRACTIVE STATE: CPT | Performed by: STUDENT IN AN ORGANIZED HEALTH CARE EDUCATION/TRAINING PROGRAM

## 2023-03-10 ASSESSMENT — REFRACTION_MANIFEST
OS_CYLINDER: -1.75
OS_CYLINDER: -2.25
OD_VA1: 20/25-2
OU_VA: 20/20
OD_VA1: 20/25+-
OD_AXIS: 180
OD_VA1: 20/25+-
OS_CYLINDER: -2.50
OD_SPHERE: -0.25
OS_ADD: +1.50
OD_VA2: 20/20(J1+)
OS_SPHERE: +1.25
OS_VA1: 20/20
OD_CYLINDER: -2.25
OS_ADD: +1.50
OD_SPHERE: +0.50
OD_ADD: +1.50
OD_AXIS: 180
OD_VA1: 20/20
OS_SPHERE: +0.75
OD_SPHERE: +0.50
OD_CYLINDER: -1.75
OS_ADD: +1.50
OD_AXIS: 180
OD_ADD: +1.50
OS_CYLINDER: -2.25
OD_ADD: +1.75
OS_ADD: +1.75
OS_AXIS: 160
OD_VA2: 20/20(J1+)
OD_CYLINDER: -1.50
OS_VA1: 20/20
OS_SPHERE: +1.00
OS_AXIS: 165
OS_SPHERE: +1.00
OS_VA1: 20/20
OS_AXIS: 160
OD_SPHERE: +0.25
OS_VA1: 20/20
OD_ADD: +1.50
OS_VA2: 20/20(J1+)
OD_AXIS: 180
OD_CYLINDER: -2.25
OS_AXIS: 160
OS_VA2: 20/20(J1+)

## 2023-03-10 ASSESSMENT — AXIALLENGTH_DERIVED
OD_AL: 23.283
OD_AL: 23.3782
OD_AL: 23.3782
OS_AL: 23.1885
OD_AL: 23.3782
OS_AL: 23.1415
OD_AL: 23.5226
OS_AL: 23.1885
OS_AL: 23.1885
OS_AL: 23.0948

## 2023-03-10 ASSESSMENT — REFRACTION_AUTOREFRACTION
OS_AXIS: 162
OD_SPHERE: +0.50
OS_CYLINDER: -2.75
OD_CYLINDER: -1.75
OD_AXIS: 179
OS_SPHERE: +1.50

## 2023-03-10 ASSESSMENT — KERATOMETRY
OS_AXISANGLE_DEGREES: 077
OD_K1POWER_DIOPTERS: 43.75
OD_AXISANGLE_DEGREES: 089
OS_K1POWER_DIOPTERS: 43.50
OD_K2POWER_DIOPTERS: 45.75
OS_K2POWER_DIOPTERS: 46.00

## 2023-03-10 ASSESSMENT — SPHEQUIV_DERIVED
OD_SPHEQUIV: -0.625
OD_SPHEQUIV: -0.625
OS_SPHEQUIV: -0.125
OD_SPHEQUIV: -0.625
OS_SPHEQUIV: 0
OS_SPHEQUIV: -0.125
OS_SPHEQUIV: -0.125
OD_SPHEQUIV: -1
OD_SPHEQUIV: -0.375
OS_SPHEQUIV: 0.125

## 2023-03-10 ASSESSMENT — VISUAL ACUITY
OD_BCVA: 20/50+2
OS_BCVA: 20/30

## 2023-03-10 ASSESSMENT — LID EXAM ASSESSMENTS
OD_BLEPHARITIS: T
OS_BLEPHARITIS: 1+

## 2023-03-10 ASSESSMENT — SUPERFICIAL PUNCTATE KERATITIS (SPK)
OD_SPK: T
OS_SPK: T

## 2023-03-10 ASSESSMENT — CONFRONTATIONAL VISUAL FIELD TEST (CVF)
OD_FINDINGS: FULL
OS_FINDINGS: FULL

## 2023-06-12 ENCOUNTER — APPOINTMENT (OUTPATIENT)
Dept: ORTHOPEDIC SURGERY | Facility: CLINIC | Age: 48
End: 2023-06-12

## 2023-07-07 ENCOUNTER — RX RENEWAL (OUTPATIENT)
Age: 48
End: 2023-07-07

## 2023-09-20 PROBLEM — H34.9 OCCLUSION, RETINAL, ARTERIAL: Status: ACTIVE | Noted: 2019-03-01

## 2023-09-20 PROBLEM — F41.1 ANXIETY STATE: Status: ACTIVE | Noted: 2019-03-01

## 2023-09-20 PROBLEM — D68.61 ANTIPHOSPHOLIPID SYNDROME: Status: ACTIVE | Noted: 2021-01-13

## 2023-09-21 ENCOUNTER — APPOINTMENT (OUTPATIENT)
Dept: GASTROENTEROLOGY | Facility: CLINIC | Age: 48
End: 2023-09-21
Payer: MEDICAID

## 2023-09-21 ENCOUNTER — TRANSCRIPTION ENCOUNTER (OUTPATIENT)
Age: 48
End: 2023-09-21

## 2023-09-21 DIAGNOSIS — Z78.9 OTHER SPECIFIED HEALTH STATUS: ICD-10-CM

## 2023-09-21 DIAGNOSIS — D68.61 ANTIPHOSPHOLIPID SYNDROME: ICD-10-CM

## 2023-09-21 DIAGNOSIS — H34.9 UNSPECIFIED RETINAL VASCULAR OCCLUSION: ICD-10-CM

## 2023-09-21 DIAGNOSIS — Z80.0 FAMILY HISTORY OF MALIGNANT NEOPLASM OF DIGESTIVE ORGANS: ICD-10-CM

## 2023-09-21 DIAGNOSIS — F41.1 GENERALIZED ANXIETY DISORDER: ICD-10-CM

## 2023-09-21 PROCEDURE — 99204 OFFICE O/P NEW MOD 45 MIN: CPT

## 2023-09-21 RX ORDER — SODIUM SULFATE, POTASSIUM SULFATE AND MAGNESIUM SULFATE 1.6; 3.13; 17.5 G/177ML; G/177ML; G/177ML
17.5-3.13-1.6 SOLUTION ORAL
Qty: 1 | Refills: 0 | Status: ACTIVE | COMMUNITY
Start: 2023-09-21 | End: 1900-01-01

## 2023-10-18 DIAGNOSIS — R29.898 OTHER SYMPTOMS AND SIGNS INVOLVING THE MUSCULOSKELETAL SYSTEM: ICD-10-CM

## 2024-01-11 ENCOUNTER — RX RENEWAL (OUTPATIENT)
Age: 49
End: 2024-01-11

## 2024-01-11 RX ORDER — LEVETIRACETAM 500 MG/1
500 TABLET, FILM COATED ORAL
Qty: 270 | Refills: 1 | Status: ACTIVE | COMMUNITY
Start: 2020-05-13 | End: 1900-01-01

## 2024-01-30 ENCOUNTER — APPOINTMENT (OUTPATIENT)
Dept: NEUROLOGY | Facility: CLINIC | Age: 49
End: 2024-01-30
Payer: MEDICAID

## 2024-01-30 VITALS
SYSTOLIC BLOOD PRESSURE: 102 MMHG | HEART RATE: 59 BPM | WEIGHT: 125 LBS | DIASTOLIC BLOOD PRESSURE: 68 MMHG | HEIGHT: 66 IN | BODY MASS INDEX: 20.09 KG/M2

## 2024-01-30 PROCEDURE — 99214 OFFICE O/P EST MOD 30 MIN: CPT

## 2024-01-30 NOTE — HISTORY OF PRESENT ILLNESS
[FreeTextEntry1] : cc- seizures  Still having episodes monthly during ovulation.  For one full day has 1- 3 episodes, pre-syncopal.  Feels like she may pass out but she will move her right hand and fingers in a strange way, Posture does not matter. Lasts 30-60 secs.  HPI- Since last visit had no auras, seizures, syncope or focal sxs until Thursday at 9AM- driving to work feeling fine when suddenly got an odd feeling in her head, light headed but felt more unusual than that.  Three weeks ago had COVID.  Since has had chest palps- EKG ok.  meds- alprazolam 0.25 (very rare) Vit D3 30mg/d Vit B12  Levetiracetam 500-750 baby ASA

## 2024-01-30 NOTE — ASSESSMENT
[FreeTextEntry1] :  A/p- Possible partial seizure in past as well as a witnessed GTC in sleep 1/11/19. All occurred in context of a dx of anti-phospholipid syndrome and a CRV occlusion. Now stable on baby ASA. Continues to have monthly auras around period.  One possible recent aura at 9AM - Lev trough low.  - Increase Lev 500 - 750

## 2024-04-11 ENCOUNTER — NON-APPOINTMENT (OUTPATIENT)
Age: 49
End: 2024-04-11

## 2024-04-15 ENCOUNTER — NON-APPOINTMENT (OUTPATIENT)
Age: 49
End: 2024-04-15

## 2024-04-29 ENCOUNTER — APPOINTMENT (OUTPATIENT)
Dept: GASTROENTEROLOGY | Facility: HOSPITAL | Age: 49
End: 2024-04-29

## 2024-05-05 ENCOUNTER — RESULT CHARGE (OUTPATIENT)
Age: 49
End: 2024-05-05

## 2024-05-06 ENCOUNTER — LABORATORY RESULT (OUTPATIENT)
Age: 49
End: 2024-05-06

## 2024-05-06 ENCOUNTER — APPOINTMENT (OUTPATIENT)
Dept: INTERNAL MEDICINE | Facility: CLINIC | Age: 49
End: 2024-05-06
Payer: MEDICAID

## 2024-05-06 ENCOUNTER — NON-APPOINTMENT (OUTPATIENT)
Age: 49
End: 2024-05-06

## 2024-05-06 VITALS
BODY MASS INDEX: 19.86 KG/M2 | WEIGHT: 123.56 LBS | HEART RATE: 68 BPM | RESPIRATION RATE: 16 BRPM | HEIGHT: 66 IN | DIASTOLIC BLOOD PRESSURE: 60 MMHG | SYSTOLIC BLOOD PRESSURE: 106 MMHG | OXYGEN SATURATION: 98 % | TEMPERATURE: 97.3 F

## 2024-05-06 DIAGNOSIS — E55.9 VITAMIN D DEFICIENCY, UNSPECIFIED: ICD-10-CM

## 2024-05-06 DIAGNOSIS — E53.8 DEFICIENCY OF OTHER SPECIFIED B GROUP VITAMINS: ICD-10-CM

## 2024-05-06 DIAGNOSIS — Z15.89 GENETIC SUSCEPTIBILITY TO OTHER DISEASE: ICD-10-CM

## 2024-05-06 DIAGNOSIS — Z12.11 ENCOUNTER FOR SCREENING FOR MALIGNANT NEOPLASM OF COLON: ICD-10-CM

## 2024-05-06 DIAGNOSIS — G40.109 LOCALIZATION-RELATED (FOCAL) (PARTIAL) SYMPTOMATIC EPILEPSY AND EPILEPTIC SYNDROMES WITH SIMPLE PARTIAL SEIZURES, NOT INTRACTABLE, W/OUT STATUS EPILEPTICUS: ICD-10-CM

## 2024-05-06 DIAGNOSIS — Z12.39 ENCOUNTER FOR OTHER SCREENING FOR MALIGNANT NEOPLASM OF BREAST: ICD-10-CM

## 2024-05-06 DIAGNOSIS — Z00.00 ENCOUNTER FOR GENERAL ADULT MEDICAL EXAMINATION W/OUT ABNORMAL FINDINGS: ICD-10-CM

## 2024-05-06 DIAGNOSIS — Z13.6 ENCOUNTER FOR SCREENING FOR CARDIOVASCULAR DISORDERS: ICD-10-CM

## 2024-05-06 LAB
BILIRUB UR QL STRIP: NORMAL
CLARITY UR: CLEAR
COLLECTION METHOD: NORMAL
GLUCOSE UR-MCNC: NORMAL
HCG UR QL: 0.2 EU/DL
HGB UR QL STRIP.AUTO: NORMAL
KETONES UR-MCNC: NORMAL
LEUKOCYTE ESTERASE UR QL STRIP: NORMAL
NITRITE UR QL STRIP: NORMAL
PH UR STRIP: 7
PROT UR STRIP-MCNC: NORMAL
SP GR UR STRIP: 1.02

## 2024-05-06 PROCEDURE — 99396 PREV VISIT EST AGE 40-64: CPT

## 2024-05-06 PROCEDURE — 81003 URINALYSIS AUTO W/O SCOPE: CPT | Mod: QW

## 2024-05-06 NOTE — HEALTH RISK ASSESSMENT
[Yes] : Yes [2 - 4 times a month (2 pts)] : 2-4 times a month (2 points) [1] : 1) Little interest or pleasure doing things for several days (1) [No Retinopathy] : No retinopathy [Patient reported PAP Smear was normal] : Patient reported PAP Smear was normal [HIV test declined] : HIV test declined [Hepatitis C test declined] : Hepatitis C test declined [None] : None [With Family] : lives with family [# of Members in Household ___] :  household currently consist of [unfilled] member(s) [Employed] : employed [Graduate School] : graduate school [] :  [# Of Children ___] : has [unfilled] children [Sexually Active] : sexually active [Feels Safe at Home] : Feels safe at home [Fully functional (bathing, dressing, toileting, transferring, walking, feeding)] : Fully functional (bathing, dressing, toileting, transferring, walking, feeding) [Fully functional (using the telephone, shopping, preparing meals, housekeeping, doing laundry, using] : Fully functional and needs no help or supervision to perform IADLs (using the telephone, shopping, preparing meals, housekeeping, doing laundry, using transportation, managing medications and managing finances) [Reports normal functional visual acuity (ie: able to read med bottle)] : Reports normal functional visual acuity [Smoke Detector] : smoke detector [Carbon Monoxide Detector] : carbon monoxide detector [Seat Belt] :  uses seat belt [Former] : Former [Good] : ~his/her~ current health as good [1 or 2 (0 pts)] : 1 or 2 (0 points) [Never (0 pts)] : Never (0 points) [No] : In the past 12 months have you used drugs other than those required for medical reasons? 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 Pineda  going 2//23  [ColonoscopyComments] : referred today 01/23

## 2024-05-06 NOTE — ASSESSMENT
[FreeTextEntry1] : A 48-year-old female who present to the office for a work physical and evaluation of TB

## 2024-05-06 NOTE — DATA REVIEWED
[FreeTextEntry1] : EKG noted no acute changes noted from prior EKGs Reviewed prior labs from 01/23  lipid panel elevated  Vitamin D levels was low.

## 2024-05-06 NOTE — HISTORY OF PRESENT ILLNESS
[FreeTextEntry1] : Physical Exam. [de-identified] : Mrs. BOYCE is a 48year female, with a past medical history as noted below, who present to the office today for Physical exam. Has Aura around menses but no seizures.

## 2024-05-06 NOTE — COUNSELING
[AUDIT-C Screening administered and reviewed] : AUDIT-C Screening administered and reviewed [Encouraged to increase physical activity] : Encouraged to increase physical activity [Good understanding] : Patient has a good understanding of disease, goals and obesity follow-up plan [FreeTextEntry2] :  encourage patient increase fluid intake

## 2024-05-06 NOTE — PLAN
[FreeTextEntry1] : Cardiopulmonary  -Antiphospholipid disorder MTHFR       We reviewed and discussed the EKG.   Patient was advised the importance of participating in aerobic exercise programs improve their stamina.  RADHIKA was encouraged to continue with her exercise program.  check labs.  Continue with ASA.  Try to get 10,000 streps daily Follow up with heme for Antiphospholipid ab   Neuro - seizure disorder - Continue current medication.  Advised to follow up with neuro. Check labs for anemia.  Advised patient increase fluid during her cycle to see if it helps Continue with current medications and check Keppra levels.  Optho - Retinal eye occlusion - Follow up with ophthalmology.  Continue with ASA   Psych - Anxiety depression screening completed -  Counseling given to the patient.  GYN - Follow up with GYN for routine PAP.  Advised SBE -    Advised patient to have yearly clinical breast exam done by gynecology.  Monitor mammogram on a yearly basis. RXn given for Mammo  IMMUNIZATION-   TB screening check TB Gold test.   Dragon speech recognition software was used to create portions of this document.  An attempt at proofreading has been made to minimize errors please call if any questions arise.

## 2024-05-06 NOTE — REVIEW OF SYSTEMS
[Sore Throat] : sore throat [Palpitations] : palpitations [Joint Stiffness] : joint stiffness [Muscle Pain] : muscle pain [Dizziness] : dizziness [Anxiety] : anxiety [Easy Bruising] : easy bruising [Negative] : Integumentary [Joint Pain] : joint pain [Headache] : headache [Fever] : no fever [Chills] : no chills [Fatigue] : no fatigue [Night Sweats] : no night sweats [Recent Change In Weight] : ~T no recent weight change [Pain] : no pain [Vision Problems] : no vision problems [Itching] : no itching [Earache] : no earache [Hearing Loss] : no hearing loss [Nosebleed] : no nosebleeds [Nasal Discharge] : no nasal discharge [Postnasal Drip] : no postnasal drip [Chest Pain] : no chest pain [Leg Claudication] : no leg claudication [Lower Ext Edema] : no lower extremity edema [Shortness Of Breath] : no shortness of breath [Wheezing] : no wheezing [Cough] : no cough [Dyspnea on Exertion] : no dyspnea on exertion [Abdominal Pain] : no abdominal pain [Nausea] : no nausea [Constipation] : no constipation [Diarrhea] : diarrhea [Vomiting] : no vomiting [Heartburn] : no heartburn [Melena] : no melena [Dysuria] : no dysuria [Incontinence] : no incontinence [Nocturia] : no nocturia [Hematuria] : no hematuria [Frequency] : no frequency [Back Pain] : no back pain [Itching] : no itching [Mole Changes] : no mole changes [Nail Changes] : no nail changes [Hair Changes] : no hair changes [Skin Rash] : no skin rash [Fainting] : no fainting [Confusion] : no confusion [Memory Loss] : no memory loss [Depression] : no depression [Easy Bleeding] : no easy bleeding [Swollen Glands] : no swollen glands [FreeTextEntry3] : floaters  [FreeTextEntry9] : shoulder and gluteal pain  [de-identified] : occasional HA  [de-identified] : on asa

## 2024-05-06 NOTE — PHYSICAL EXAM
[No Acute Distress] : no acute distress [Well Nourished] : well nourished [Well Developed] : well developed [Well-Appearing] : well-appearing [Normal Sclera/Conjunctiva] : normal sclera/conjunctiva [PERRL] : pupils equal round and reactive to light [EOMI] : extraocular movements intact [Normal Outer Ear/Nose] : the outer ears and nose were normal in appearance [Normal Oropharynx] : the oropharynx was normal [Normal TMs] : both tympanic membranes were normal [No JVD] : no jugular venous distention [No Lymphadenopathy] : no lymphadenopathy [Supple] : supple [Thyroid Normal, No Nodules] : the thyroid was normal and there were no nodules present [No Respiratory Distress] : no respiratory distress  [No Accessory Muscle Use] : no accessory muscle use [Clear to Auscultation] : lungs were clear to auscultation bilaterally [Normal Rate] : normal rate  [Regular Rhythm] : with a regular rhythm [Normal S1, S2] : normal S1 and S2 [No Carotid Bruits] : no carotid bruits [No Abdominal Bruit] : a ~M bruit was not heard ~T in the abdomen [Pedal Pulses Present] : the pedal pulses are present [No Edema] : there was no peripheral edema [No Palpable Aorta] : no palpable aorta [No Extremity Clubbing/Cyanosis] : no extremity clubbing/cyanosis [Soft] : abdomen soft [Non Tender] : non-tender [Non-distended] : non-distended [No Masses] : no abdominal mass palpated [No HSM] : no HSM [Normal Bowel Sounds] : normal bowel sounds [Normal Posterior Cervical Nodes] : no posterior cervical lymphadenopathy [Normal Anterior Cervical Nodes] : no anterior cervical lymphadenopathy [No CVA Tenderness] : no CVA  tenderness [No Spinal Tenderness] : no spinal tenderness [No Joint Swelling] : no joint swelling [Grossly Normal Strength/Tone] : grossly normal strength/tone [No Rash] : no rash [Coordination Grossly Intact] : coordination grossly intact [No Focal Deficits] : no focal deficits [Normal Gait] : normal gait [Deep Tendon Reflexes (DTR)] : deep tendon reflexes were 2+ and symmetric [Normal Affect] : the affect was normal [Alert and Oriented x3] : oriented to person, place, and time [Normal Mood] : the mood was normal [Normal Insight/Judgement] : insight and judgment were intact [de-identified] : w/ murmur 1/6 [de-identified] :  as per gynecology [de-identified] : flat [FreeTextEntry1] :  as per gynecology [de-identified] :  as per gynecology [de-identified] :  negative Romberg

## 2024-05-08 LAB
25(OH)D3 SERPL-MCNC: 31.4 NG/ML
ALBUMIN SERPL ELPH-MCNC: 4.4 G/DL
ALP BLD-CCNC: 44 U/L
ALT SERPL-CCNC: 12 U/L
ANION GAP SERPL CALC-SCNC: 10 MMOL/L
AST SERPL-CCNC: 20 U/L
BASOPHILS # BLD AUTO: 0.05 K/UL
BASOPHILS NFR BLD AUTO: 1 %
BILIRUB SERPL-MCNC: 0.4 MG/DL
BUN SERPL-MCNC: 14 MG/DL
CALCIUM SERPL-MCNC: 9.2 MG/DL
CARDIOLIPIN AB SER IA-ACNC: POSITIVE
CHLORIDE SERPL-SCNC: 102 MMOL/L
CHOLEST SERPL-MCNC: 188 MG/DL
CO2 SERPL-SCNC: 27 MMOL/L
CREAT SERPL-MCNC: 0.87 MG/DL
EGFR: 83 ML/MIN/1.73M2
EOSINOPHIL # BLD AUTO: 0.24 K/UL
EOSINOPHIL NFR BLD AUTO: 4.7 %
GLUCOSE SERPL-MCNC: 93 MG/DL
HCT VFR BLD CALC: 41.2 %
HDLC SERPL-MCNC: 80 MG/DL
HGB BLD-MCNC: 13.4 G/DL
IMM GRANULOCYTES NFR BLD AUTO: 0.2 %
LDLC SERPL CALC-MCNC: 97 MG/DL
LYMPHOCYTES # BLD AUTO: 1.6 K/UL
LYMPHOCYTES NFR BLD AUTO: 31.4 %
MAN DIFF?: NORMAL
MCHC RBC-ENTMCNC: 32.1 PG
MCHC RBC-ENTMCNC: 32.5 GM/DL
MCV RBC AUTO: 98.6 FL
MONOCYTES # BLD AUTO: 0.39 K/UL
MONOCYTES NFR BLD AUTO: 7.6 %
NEUTROPHILS # BLD AUTO: 2.81 K/UL
NEUTROPHILS NFR BLD AUTO: 55.1 %
NONHDLC SERPL-MCNC: 108 MG/DL
PLATELET # BLD AUTO: 198 K/UL
POTASSIUM SERPL-SCNC: 4.6 MMOL/L
PROT SERPL-MCNC: 6.7 G/DL
RBC # BLD: 4.18 M/UL
RBC # FLD: 12.3 %
SODIUM SERPL-SCNC: 139 MMOL/L
T4 FREE SERPL-MCNC: 1.1 NG/DL
TRIGL SERPL-MCNC: 59 MG/DL
TSH SERPL-ACNC: 1.54 UIU/ML
VIT B12 SERPL-MCNC: 558 PG/ML
WBC # FLD AUTO: 5.1 K/UL

## 2024-05-09 LAB
M TB IFN-G BLD-IMP: NEGATIVE
QUANTIFERON TB PLUS MITOGEN MINUS NIL: >10 IU/ML
QUANTIFERON TB PLUS NIL: 0.05 IU/ML
QUANTIFERON TB PLUS TB1 MINUS NIL: 0 IU/ML
QUANTIFERON TB PLUS TB2 MINUS NIL: 0 IU/ML

## 2024-05-10 LAB — LEVETIRACETAM SERPL-MCNC: 22 UG/ML

## 2024-07-01 RX ORDER — LEVOMEFOLATE CALCIUM 7.5 MG
7.5 TABLET ORAL
Qty: 90 | Refills: 0 | Status: ACTIVE | COMMUNITY
Start: 2024-07-01 | End: 1900-01-01

## 2024-07-15 ENCOUNTER — APPOINTMENT (OUTPATIENT)
Dept: INTERNAL MEDICINE | Facility: CLINIC | Age: 49
End: 2024-07-15

## 2024-08-05 ENCOUNTER — RX RENEWAL (OUTPATIENT)
Age: 49
End: 2024-08-05

## 2024-08-26 ENCOUNTER — OUTPATIENT (OUTPATIENT)
Dept: OUTPATIENT SERVICES | Facility: HOSPITAL | Age: 49
LOS: 1 days | End: 2024-08-26
Payer: MEDICAID

## 2024-08-26 ENCOUNTER — APPOINTMENT (OUTPATIENT)
Dept: GASTROENTEROLOGY | Facility: HOSPITAL | Age: 49
End: 2024-08-26

## 2024-08-26 DIAGNOSIS — Z12.11 ENCOUNTER FOR SCREENING FOR MALIGNANT NEOPLASM OF COLON: ICD-10-CM

## 2024-08-26 LAB — HCG UR QL: NEGATIVE — SIGNIFICANT CHANGE UP

## 2024-08-26 PROCEDURE — 45378 DIAGNOSTIC COLONOSCOPY: CPT

## 2024-08-26 PROCEDURE — 81025 URINE PREGNANCY TEST: CPT

## 2024-08-26 PROCEDURE — G0121: CPT

## 2024-08-26 DEVICE — CLIP RESOLUTION 360 235CM: Type: IMPLANTABLE DEVICE | Status: FUNCTIONAL

## 2024-08-26 DEVICE — HEMOSPRAY HEMOSTAT ENDO 7F: Type: IMPLANTABLE DEVICE | Status: FUNCTIONAL

## 2025-04-21 ENCOUNTER — NON-APPOINTMENT (OUTPATIENT)
Age: 50
End: 2025-04-21

## 2025-04-22 ENCOUNTER — NON-APPOINTMENT (OUTPATIENT)
Age: 50
End: 2025-04-22

## 2025-04-23 ENCOUNTER — APPOINTMENT (OUTPATIENT)
Dept: NEUROLOGY | Facility: CLINIC | Age: 50
End: 2025-04-23
Payer: MEDICAID

## 2025-04-23 VITALS — SYSTOLIC BLOOD PRESSURE: 135 MMHG | HEART RATE: 79 BPM | DIASTOLIC BLOOD PRESSURE: 91 MMHG

## 2025-04-23 PROCEDURE — 99214 OFFICE O/P EST MOD 30 MIN: CPT

## 2025-05-07 ENCOUNTER — MED ADMIN CHARGE (OUTPATIENT)
Age: 50
End: 2025-05-07

## 2025-05-07 ENCOUNTER — NON-APPOINTMENT (OUTPATIENT)
Age: 50
End: 2025-05-07

## 2025-05-07 ENCOUNTER — APPOINTMENT (OUTPATIENT)
Dept: INTERNAL MEDICINE | Facility: CLINIC | Age: 50
End: 2025-05-07
Payer: MEDICAID

## 2025-05-07 VITALS
BODY MASS INDEX: 18.96 KG/M2 | HEART RATE: 57 BPM | OXYGEN SATURATION: 98 % | DIASTOLIC BLOOD PRESSURE: 74 MMHG | HEIGHT: 66 IN | WEIGHT: 118 LBS | SYSTOLIC BLOOD PRESSURE: 100 MMHG

## 2025-05-07 DIAGNOSIS — G40.109 LOCALIZATION-RELATED (FOCAL) (PARTIAL) SYMPTOMATIC EPILEPSY AND EPILEPTIC SYNDROMES WITH SIMPLE PARTIAL SEIZURES, NOT INTRACTABLE, W/OUT STATUS EPILEPTICUS: ICD-10-CM

## 2025-05-07 DIAGNOSIS — R63.4 ABNORMAL WEIGHT LOSS: ICD-10-CM

## 2025-05-07 DIAGNOSIS — Z11.1 ENCOUNTER FOR SCREENING FOR RESPIRATORY TUBERCULOSIS: ICD-10-CM

## 2025-05-07 DIAGNOSIS — Z23 ENCOUNTER FOR IMMUNIZATION: ICD-10-CM

## 2025-05-07 DIAGNOSIS — Z00.00 ENCOUNTER FOR GENERAL ADULT MEDICAL EXAMINATION W/OUT ABNORMAL FINDINGS: ICD-10-CM

## 2025-05-07 DIAGNOSIS — D68.61 ANTIPHOSPHOLIPID SYNDROME: ICD-10-CM

## 2025-05-07 DIAGNOSIS — Z13.31 ENCOUNTER FOR SCREENING FOR DEPRESSION: ICD-10-CM

## 2025-05-07 DIAGNOSIS — R42 DIZZINESS AND GIDDINESS: ICD-10-CM

## 2025-05-07 DIAGNOSIS — F41.1 GENERALIZED ANXIETY DISORDER: ICD-10-CM

## 2025-05-07 DIAGNOSIS — H34.9 UNSPECIFIED RETINAL VASCULAR OCCLUSION: ICD-10-CM

## 2025-05-07 PROCEDURE — 93000 ELECTROCARDIOGRAM COMPLETE: CPT | Mod: 59

## 2025-05-07 PROCEDURE — G0444 DEPRESSION SCREEN ANNUAL: CPT | Mod: 59

## 2025-05-07 PROCEDURE — 99396 PREV VISIT EST AGE 40-64: CPT | Mod: 25

## 2025-05-07 PROCEDURE — 90715 TDAP VACCINE 7 YRS/> IM: CPT

## 2025-05-07 PROCEDURE — 90471 IMMUNIZATION ADMIN: CPT

## 2025-05-07 RX ORDER — LEVETIRACETAM 750 MG/1
750 TABLET, FILM COATED ORAL
Refills: 0 | Status: ACTIVE | COMMUNITY

## 2025-05-12 LAB
25(OH)D3 SERPL-MCNC: 28.5 NG/ML
ALBUMIN SERPL ELPH-MCNC: 4.5 G/DL
ALP BLD-CCNC: 51 U/L
ALT SERPL-CCNC: 15 U/L
ANION GAP SERPL CALC-SCNC: 12 MMOL/L
AST SERPL-CCNC: 24 U/L
BASOPHILS # BLD AUTO: 0.03 K/UL
BASOPHILS NFR BLD AUTO: 0.7 %
BILIRUB SERPL-MCNC: 0.5 MG/DL
BUN SERPL-MCNC: 13 MG/DL
CALCIUM SERPL-MCNC: 9.7 MG/DL
CHLORIDE SERPL-SCNC: 101 MMOL/L
CHOLEST SERPL-MCNC: 201 MG/DL
CO2 SERPL-SCNC: 26 MMOL/L
CREAT SERPL-MCNC: 0.9 MG/DL
EGFRCR SERPLBLD CKD-EPI 2021: 79 ML/MIN/1.73M2
EOSINOPHIL # BLD AUTO: 0.13 K/UL
EOSINOPHIL NFR BLD AUTO: 3 %
FOLATE RBC-MCNC: 962 NG/ML
GLUCOSE SERPL-MCNC: 96 MG/DL
HCT VFR BLD CALC: 41.8 %
HCT VFR BLD CALC: 43.1 %
HCYS SERPL-MCNC: 10.7 UMOL/L
HDLC SERPL-MCNC: 88 MG/DL
HGB BLD-MCNC: 13.6 G/DL
IMM GRANULOCYTES NFR BLD AUTO: 0.2 %
LDLC SERPL-MCNC: 104 MG/DL
LEVETIRACETAM SERPL-MCNC: 29.8 UG/ML
LYMPHOCYTES # BLD AUTO: 1.65 K/UL
LYMPHOCYTES NFR BLD AUTO: 37.8 %
M TB IFN-G BLD-IMP: NEGATIVE
MAN DIFF?: NORMAL
MCHC RBC-ENTMCNC: 31.4 PG
MCHC RBC-ENTMCNC: 31.6 G/DL
MCV RBC AUTO: 99.5 FL
MONOCYTES # BLD AUTO: 0.41 K/UL
MONOCYTES NFR BLD AUTO: 9.4 %
NEUTROPHILS # BLD AUTO: 2.14 K/UL
NEUTROPHILS NFR BLD AUTO: 48.9 %
NONHDLC SERPL-MCNC: 114 MG/DL
PLATELET # BLD AUTO: 181 K/UL
POTASSIUM SERPL-SCNC: 4.4 MMOL/L
PROT SERPL-MCNC: 7.1 G/DL
QUANTIFERON TB PLUS MITOGEN MINUS NIL: >10 IU/ML
QUANTIFERON TB PLUS NIL: 0.14 IU/ML
QUANTIFERON TB PLUS TB1 MINUS NIL: 0.01 IU/ML
QUANTIFERON TB PLUS TB2 MINUS NIL: 0 IU/ML
RBC # BLD: 4.33 M/UL
RBC # FLD: 11.9 %
SODIUM SERPL-SCNC: 139 MMOL/L
T4 FREE SERPL-MCNC: 1.2 NG/DL
TRIGL SERPL-MCNC: 54 MG/DL
TSH SERPL-ACNC: 1.16 UIU/ML
WBC # FLD AUTO: 4.37 K/UL

## 2025-05-14 RX ORDER — MULTIVIT-MIN/IRON/FOLIC ACID/K 18-600-40
50 MCG CAPSULE ORAL
Refills: 0 | Status: ACTIVE | COMMUNITY
Start: 2025-05-14

## (undated) DEVICE — SUT HEWSON RETRIEVER

## (undated) DEVICE — RETRIEVER ROTH NET PLATINUM-UNIVERSAL

## (undated) DEVICE — SNARE LRG

## (undated) DEVICE — POLY TRAP ETRAP

## (undated) DEVICE — SUCTION YANKAUER TAPERED BULBOUS NO VENT

## (undated) DEVICE — CATH IV SAFE BC 20G X 1.16" (PINK)

## (undated) DEVICE — MARKER ENDO SPOT EX

## (undated) DEVICE — CATH IV SAFE BC 22G X 1" (BLUE)

## (undated) DEVICE — TUBE O2 SUPL CRUSH RESIS CONN SOUTHSIDE ONLY

## (undated) DEVICE — ENDOCUFF VISION SZ 2 LG GRN

## (undated) DEVICE — SOL IRR POUR H2O 500ML

## (undated) DEVICE — TUBING SUCTION CONN 6FT STERILE

## (undated) DEVICE — SYR LUER SLIP TIP 30CC

## (undated) DEVICE — FORCEP RADIAL JAW 4 W NDL 2.4MM 2.8MM 240CM ORANGE DISP

## (undated) DEVICE — TUBING IV SET SECONDARY 34"

## (undated) DEVICE — TUBING IV SET GRAVITY 3Y 100" MACRO

## (undated) DEVICE — TUBING CANNULA SALTER LABS NASAL ADULT 7FT

## (undated) DEVICE — BRUSH COLONOSCOPY CYTOLOGY

## (undated) DEVICE — SYR LUER SLIP TIP 50CC

## (undated) DEVICE — SNARE CAPTIVATOR II RND COLD 10MM

## (undated) DEVICE — SYR IV POSIFLUSH NS 3ML 30/TY

## (undated) DEVICE — NDL INJ SCLERO INTERJECT 23G

## (undated) DEVICE — MASK O2 NON REBREATH 3IN1 ADULT

## (undated) DEVICE — CANISTER SUCTION 1200CC 10/SL

## (undated) DEVICE — SNARE POLYP SENS 27MM 240CM

## (undated) DEVICE — TUBE RECTAL 24FR

## (undated) DEVICE — SENSOR O2 FINGER XL ADULT 24/BX 6BX/CA

## (undated) DEVICE — TRAP QUICK CATCH  SINGL CHAMBER

## (undated) DEVICE — ELCTR GROUNDING PAD ADULT COVIDIEN

## (undated) DEVICE — ENDOCUFF VISION SZ 3 SM PRPL

## (undated) DEVICE — STERIS DEFENDO 3-PIECE KIT (AIR/WATER, SUCTION & BIOPSY VALVES)

## (undated) DEVICE — FORCEP RADIAL JAW 4 JUMBO 2.8MM 3.2MM 240CM ORANGE DISP

## (undated) DEVICE — MASK LRG MED AND HIGH O2 CONC M TO M 10FT

## (undated) DEVICE — FORMALIN CUPS 10% BUFFERED

## (undated) DEVICE — VALVE BIOPSY

## (undated) DEVICE — PACK IV START WITH CHG

## (undated) DEVICE — Device